# Patient Record
Sex: FEMALE | Race: WHITE | NOT HISPANIC OR LATINO | Employment: OTHER | URBAN - METROPOLITAN AREA
[De-identification: names, ages, dates, MRNs, and addresses within clinical notes are randomized per-mention and may not be internally consistent; named-entity substitution may affect disease eponyms.]

---

## 2017-01-12 ENCOUNTER — AMBULATORY - HEALTHEAST (OUTPATIENT)
Dept: LAB | Facility: CLINIC | Age: 57
End: 2017-01-12

## 2017-01-12 ENCOUNTER — HOSPITAL ENCOUNTER (OUTPATIENT)
Dept: ULTRASOUND IMAGING | Facility: HOSPITAL | Age: 57
Discharge: HOME | End: 2017-01-12
Attending: INTERNAL MEDICINE

## 2017-01-12 DIAGNOSIS — C73 MALIGNANT NEOPLASM OF THYROID GLAND (H): ICD-10-CM

## 2017-01-12 DIAGNOSIS — E89.0 POSTSURGICAL HYPOTHYROIDISM: ICD-10-CM

## 2017-01-19 ENCOUNTER — OFFICE VISIT - HEALTHEAST (OUTPATIENT)
Dept: ENDOCRINOLOGY | Facility: CLINIC | Age: 57
End: 2017-01-19

## 2017-01-19 DIAGNOSIS — E89.0 POSTSURGICAL HYPOTHYROIDISM: ICD-10-CM

## 2017-01-19 DIAGNOSIS — C73 MALIGNANT NEOPLASM OF THYROID GLAND (H): ICD-10-CM

## 2017-01-19 ASSESSMENT — MIFFLIN-ST. JEOR: SCORE: 1384.61

## 2017-04-10 ENCOUNTER — OFFICE VISIT (OUTPATIENT)
Dept: FAMILY MEDICINE | Facility: CLINIC | Age: 57
End: 2017-04-10

## 2017-04-10 VITALS
OXYGEN SATURATION: 98 % | HEART RATE: 66 BPM | RESPIRATION RATE: 18 BRPM | TEMPERATURE: 98 F | DIASTOLIC BLOOD PRESSURE: 83 MMHG | WEIGHT: 177 LBS | BODY MASS INDEX: 29.49 KG/M2 | SYSTOLIC BLOOD PRESSURE: 133 MMHG | HEIGHT: 65 IN

## 2017-04-10 DIAGNOSIS — E89.0 POSTOPERATIVE HYPOTHYROIDISM: Primary | ICD-10-CM

## 2017-04-10 DIAGNOSIS — E78.2 MIXED HYPERLIPIDEMIA: ICD-10-CM

## 2017-04-10 PROBLEM — C43.9 MELANOMA OF SKIN (H): Status: ACTIVE | Noted: 2017-04-10

## 2017-04-10 PROBLEM — G35 MULTIPLE SCLEROSIS (H): Status: ACTIVE | Noted: 2017-04-10

## 2017-04-10 PROBLEM — Z85.850 HX OF THYROID CANCER: Status: ACTIVE | Noted: 2017-04-10

## 2017-04-10 LAB
ALBUMIN SERPL-MCNC: 3.6 G/DL (ref 3.2–4.5)
ALP SERPL-CCNC: 74 U/L (ref 40–150)
ALT SERPL-CCNC: 26 U/L (ref 0–45)
AST SERPL-CCNC: 29 U/L (ref 0–45)
BILIRUB SERPL-MCNC: 0.8 MG/DL (ref 0.2–1.3)
BUN SERPL-MCNC: 11 MG/DL (ref 7–30)
CALCIUM SERPL-MCNC: 9.2 MG/DL (ref 8.5–10.4)
CHLORIDE SERPLBLD-SCNC: 101 MMOL/L (ref 94–109)
CHOLEST SERPL-MCNC: 191 MG/DL
CO2 SERPL-SCNC: 27 MMOL/L (ref 20–32)
CREAT SERPL-MCNC: 0.9 MG/DL (ref 0.6–1.3)
EGFR CALCULATED (BLACK REFERENCE): 82.7 ML/MIN
EGFR CALCULATED (NON BLACK REFERENCE): 68.4 ML/MIN
FASTING?: NORMAL
GLUCOSE SERPL-MCNC: 94 MG/DL (ref 60–109)
HCT VFR BLD AUTO: 43.9 % (ref 35–47)
HDLC SERPL-MCNC: 54 MG/DL
HEMOGLOBIN: 14.4 G/DL (ref 11.7–15.7)
LDLC SERPL CALC-MCNC: 117 MG/DL
MCH RBC QN AUTO: 31.2 PG (ref 26.5–35)
MCHC RBC AUTO-ENTMCNC: 32.8 G/DL (ref 32–36)
MCV RBC AUTO: 95.1 FL (ref 78–100)
PLATELET # BLD AUTO: 330 K/UL (ref 150–450)
POTASSIUM SERPL-SCNC: 4.3 MMOL/L (ref 3.4–5.3)
PROT SERPL-MCNC: 7 G/DL (ref 6.6–8.8)
RBC # BLD AUTO: 4.62 M/UL (ref 3.8–5.2)
SODIUM SERPL-SCNC: 144 MMOL/L (ref 133–144)
TRIGL SERPL-MCNC: 101 MG/DL
TSH SERPL DL<=0.05 MIU/L-ACNC: 0.13 UIU/ML (ref 0.3–5)
WBC # BLD AUTO: 6.7 K/UL (ref 4–11)

## 2017-04-10 RX ORDER — LEVOTHYROXINE SODIUM 100 UG/1
100 TABLET ORAL DAILY
Qty: 90 TABLET | Refills: 3 | COMMUNITY
Start: 2017-04-10 | End: 2018-03-19

## 2017-04-10 RX ORDER — ATORVASTATIN CALCIUM 10 MG/1
10 TABLET, FILM COATED ORAL DAILY
Qty: 90 TABLET | Refills: 3 | COMMUNITY
Start: 2017-04-10 | End: 2018-03-19

## 2017-04-10 NOTE — MR AVS SNAPSHOT
After Visit Summary   4/10/2017    Padma Kelly    MRN: [id number]           Patient Information     Date Of Birth          1960        Visit Information        Provider Department      4/10/2017 8:00 AM Dahiana Rios DO [facility]        Today's Diagnoses     Postablative hypothyroidism    -  1    Mixed hyperlipidemia          Care Instructions    Your medication list is printed, please keep this with you, it is helpful to bring this current list to any other medical appointments, the emergency room or hospital.    If you had lab testing today and your results are reassuring or normal they will be be mailed to you within 7 days.     If the lab tests need quick action we will call you with the results.   The phone number we will call with results is # [phone] (home) . If this is not the best number please call our clinic and change the number.    If you need any refills please call your pharmacy and they will contact us.    If you have any further concerns or wish to schedule another appointment you must call our office during normal business hours  [phone] (8-5:00 M-F)  If you have urgent medical questions that cannot wait  you may also call [phone] at any time of day.  If you have a medical emergency please call [phone].    Thank you for coming to [facility].          Follow-ups after your visit        Who to contact     Please call your clinic at [phone] to:    Ask questions about your health    Make or cancel appointments    Discuss your medicines    Learn about your test results    Speak to your doctor   If you have compliments or concerns about an experience at your clinic, or if you wish to file a complaint, please contact [facility] Patient Relations at [phone] or email us at [email or web address]         Additional Information About Your Visit        MyChart Information     [employer] is an electronic gateway  "that provides easy, online access to your medical records. With [employer], you can request a clinic appointment, read your test results, renew a prescription or communicate with your care team.     To sign up for [employer] visit the website at [email or web address]   You will be asked to enter the access code listed below, as well as some personal information. Please follow the directions to create your username and password.     Your access code is: [email or web address]  Expires: 2017  8:06 AM     Your access code will  in 90 days. If you need help or a new code, please contact your [facility] or call [phone] for assistance.        Care EveryWhere ID     This is your Care EveryWhere ID. This could be used by other organizations to access your [facility] medical records  [id number]        Your Vitals Were     Pulse Temperature Respirations Height Pulse Oximetry BMI (Body Mass Index)    66 98  F (36.7  C) (Oral) 18 5' 4.75\" (164.5 cm) 98% 29.68 kg/m2       Blood Pressure from Last 3 Encounters:   04/10/17 133/83    Weight from Last 3 Encounters:   04/10/17 177 lb (80.3 kg)              We Performed the Following     CBC with Plt ([facility])     Comprehensive Metabolic Panel ([facility]) - results <1hr Protocol     Lipid Panel ([facility]) - Results < 1 hr     [facility]  [facility]        Primary Care Provider Office Phone # Fax #    Dahiana DO Gabriel [phone]       [facility] [address] [place] [address]        Thank you!     Thank you for choosing [facility]  for your care. Our goal is always to provide you with excellent care. Hearing back from our patients is one way we can continue to improve our services. Please take a few minutes to complete the written survey that you may receive in the mail after your visit with us. Thank you!             Your Updated Medication List - Protect others around you: Learn how to safely use, store and " throw away your medicines at www.disposemymeds.org.          This list is accurate as of: 4/10/17  8:46 AM.  Always use your most recent med list.                   Brand Name Dispense Instructions for use    atorvastatin 10 MG tablet    LIPITOR    90 tablet    Take 1 tablet (10 mg) by mouth daily       levothyroxine 100 MCG tablet    SYNTHROID/LEVOTHROID    90 tablet    Take 1 tablet (100 mcg) by mouth daily

## 2017-04-10 NOTE — LETTER
April 12, 2017      Padma Kelly  [address] [address] [address]        Dear Padma,    Labs look good.    Please see below for your test results.    Resulted Orders   Comprehensive Metabolic Panel ([facility]) - results <1hr Protocol   Result Value Ref Range    Glucose 94.0 60.0 - 109.0 mg/dL    Urea Nitrogen 11.0 7.0 - 30.0 mg/dL    Creatinine 0.9 0.6 - 1.3 mg/dL    Sodium 144.0 133.0 - 144.0 mmol/L    Potassium 4.3 3.4 - 5.3 mmol/L    Chloride 101.0 94.0 - 109.0 mmol/L    Carbon Dioxide 27.0 20.0 - 32.0 mmol/L    Calcium 9.2 8.5 - 10.4 mg/dL    Protein Total 7.0 6.6 - 8.8 g/dL    Albumin 3.6 3.2 - 4.5 g/dL    Alkaline Phosphatase 74.0 40.0 - 150.0 U/L    ALT 26.0 0.0 - 45.0 U/L    AST 29.0 0.0 - 45.0 U/L    Bilirubin Total 0.8 0.2 - 1.3 mg/dL    eGFR Calculated (Non Black Reference) 68.4 >60.0 mL/min    eGFR Calculated (Black Reference) 82.7 >60.0 mL/min   TSH  Sensitive ([facility])   Result Value Ref Range    TSH 0.13 (L) 0.30 - 5.00 uIU/mL    Narrative    Test performed by:  [facility]  [address] [place] [address]   CBC with Plt ([facility])   Result Value Ref Range    WBC 6.7 4.0 - 11.0 K/uL    RBC 4.62 3.80 - 5.20 M/uL    Hemoglobin 14.4 11.7 - 15.7 g/dL    Hematocrit 43.9 35.0 - 47.0 %    MCV 95.1 78.0 - 100.0 fL    MCH 31.2 26.5 - 35.0 pg    MCHC 32.8 32.0 - 36.0 g/dL    Platelets 330.0 150.0 - 450.0 K/uL   [facility] Profile [facility]   Result Value Ref Range    Triglycerides 101 <=149 mg/dL    Cholesterol 191 <=199 mg/dL    LDL Cholesterol Calculated 117 <=129 mg/dL    HDL Cholesterol 54 >=50 mg/dL    Fasting? Unknown     Narrative    Test performed by:  [facility]  [address] [place] [address]       If you have any questions, please call the clinic to make an appointment.    Sincerely,    Dahiana Rios, DO

## 2017-04-10 NOTE — NURSING NOTE
4/6/2017 PCS Previsit Plan   DUE FOR:  Mychart? ok  Hep c screening  (Pending records from previous clinic)  Pap  Mammogram  Lipid  Colonoscopy/FIT  STEPHEN Rasheed signed for Entira    [facility] [place], [place] not on our directory, tasked Lavern to have it add to list.   [facility]  [email or web address]   [address] , [place] [address] [phone]    Atorvastatin 10mg 1/28/17 last filled at pharmacy

## 2017-04-10 NOTE — PROGRESS NOTES
HPI:       Padma Kelly is a 56 year old  female with a significant past medical history of multiple sclerosis and thyroid cancer and melanoma and  who presents for follow up of concern(s) listed below    1. Patient was diagnosed with thyroid cancer in 2010, papillary, she had a partial thyroidectomy, and now requires supplementation. She has been doing well. She did see her endocrinologist in January her tumor markers were normal and her thyroid US was normal. Her weight has been stable. She has been feeling very good. She enjoys exercising (biking and golf).    2. Hyperlipidemia: She has been stable on the statin therapy. She is a vegetarian and tries to limit her cholesterol intake.    3. Multiple Sclerosis: She was diagnosed in 1986. She is not on any medications. She saw Dr Esquivel about 2 years ago. She does not have feeling from her waist down in both legs and feet. Her balance is remarkably good. She has not had any recent falls. She continues to walk, bike and golf.    4. Melanoma was diagnosed in 2014. She had the lesion removed from her leg via wide excision and her lymph nodes were clear.         PMHX:     Patient Active Problem List   Diagnosis     Multiple sclerosis (H)     Melanoma of skin (H)     Hx of thyroid cancer     Postablative hypothyroidism     Mixed hyperlipidemia       Current Outpatient Prescriptions   Medication Sig Dispense Refill     atorvastatin (LIPITOR) 10 MG tablet Take 1 tablet (10 mg) by mouth daily 90 tablet 3     levothyroxine (SYNTHROID/LEVOTHROID) 100 MCG tablet Take 1 tablet (100 mcg) by mouth daily 90 tablet 3       Social History     Social History     Marital status:      Spouse name: N/A     Number of children: N/A     Years of education: N/A     Occupational History     Not on file.     Social History Main Topics     Smoking status: Never Smoker     Smokeless tobacco: Not on file     Alcohol use No     Drug use: No     Sexual activity: Not on file  "    Other Topics Concern     Not on file     Social History Narrative     No narrative on file        No Known Allergies    Results for orders placed or performed in visit on 04/10/17 (from the past 24 hour(s))   CBC with Plt ([facility])   Result Value Ref Range    WBC 6.7 4.0 - 11.0 K/uL    RBC 4.62 3.80 - 5.20 M/uL    Hemoglobin 14.4 11.7 - 15.7 g/dL    Hematocrit 43.9 35.0 - 47.0 %    MCV 95.1 78.0 - 100.0 fL    MCH 31.2 26.5 - 35.0 pg    MCHC 32.8 32.0 - 36.0 g/dL    Platelets 330.0 150.0 - 450.0 K/uL   Comprehensive Metabolic Panel ([facility]) - results <1hr Protocol   Result Value Ref Range    Glucose 94.0 60.0 - 109.0 mg/dL    Urea Nitrogen 11.0 7.0 - 30.0 mg/dL    Creatinine 0.9 0.6 - 1.3 mg/dL    Sodium 144.0 133.0 - 144.0 mmol/L    Potassium 4.3 3.4 - 5.3 mmol/L    Chloride 101.0 94.0 - 109.0 mmol/L    Carbon Dioxide 27.0 20.0 - 32.0 mmol/L    Calcium 9.2 8.5 - 10.4 mg/dL    Protein Total 7.0 6.6 - 8.8 g/dL    Albumin 3.6 3.2 - 4.5 g/dL    Alkaline Phosphatase 74.0 40.0 - 150.0 U/L    ALT 26.0 0.0 - 45.0 U/L    AST 29.0 0.0 - 45.0 U/L    Bilirubin Total 0.8 0.2 - 1.3 mg/dL    eGFR Calculated (Non Black Reference) 68.4 >60.0 mL/min    eGFR Calculated (Black Reference) 82.7 >60.0 mL/min            Review of Systems:   C: NEGATIVE for fatigue, unexpected change in weight  E: NEGATIVE for acute vision problems or changes  R: NEGATIVE for significant cough or shortness of breath  CV: NEGATIVE for chest pain, palpitations or new or worsening peripheral edema  P: NEGATIVE for changes in mood or affect          Physical Exam:     Vitals:    04/10/17 0800 04/10/17 0806   BP: 152/84 133/83   Pulse: 66    Resp: 18    Temp: 98  F (36.7  C)    TempSrc: Oral    SpO2: 98%    Weight: 177 lb (80.3 kg)    Height: 5' 4.75\" (164.5 cm)      Body mass index is 29.68 kg/(m^2).    GENERAL APPEARANCE: healthy, alert and no distress,  HENT: ear canals and TM's normal and nose and mouth without ulcers or lesions  RESP: lungs clear to " auscultation - no rales, rhonchi or wheezes  CV: regular rate and rhythm,  and no murmur, click,  rub or gallop  MS: extremities normal- no gross deformities noted      Assessment and Plan     1. Postoperative hypothyroidism    - TSH  Sensitive ([facility])  - levothyroxine (SYNTHROID/LEVOTHROID) 100 MCG tablet; Take 1 tablet (100 mcg) by mouth daily  Dispense: 90 tablet; Refill: 3    2. Mixed hyperlipidemia    - atorvastatin (LIPITOR) 10 MG tablet; Take 1 tablet (10 mg) by mouth daily  Dispense: 90 tablet; Refill: 3  - Comprehensive Metabolic Panel ([facility]) - results <1hr Protocol  - CBC with Plt ([facility])  - Lipid Profile ([facility])      Options for treatment and follow-up care were reviewed with the patient and/or guardian. Padma Kelly and/or guardian engaged in the decision making process and verbalized understanding of the options discussed and agreed with the final plan.    Dahiana Rios, DO

## 2017-04-10 NOTE — PATIENT INSTRUCTIONS
Your medication list is printed, please keep this with you, it is helpful to bring this current list to any other medical appointments, the emergency room or hospital.    If you had lab testing today and your results are reassuring or normal they will be be mailed to you within 7 days.     If the lab tests need quick action we will call you with the results.   The phone number we will call with results is # [phone] (home) . If this is not the best number please call our clinic and change the number.    If you need any refills please call your pharmacy and they will contact us.    If you have any further concerns or wish to schedule another appointment you must call our office during normal business hours  [phone] (8-5:00 M-F)  If you have urgent medical questions that cannot wait  you may also call [phone] at any time of day.  If you have a medical emergency please call [phone].    Thank you for coming to [facility].

## 2017-04-28 PROBLEM — D12.6 BENIGN NEOPLASM OF COLON: Status: ACTIVE | Noted: 2017-04-28

## 2017-10-03 ENCOUNTER — TRANSFERRED RECORDS (OUTPATIENT)
Dept: HEALTH INFORMATION MANAGEMENT | Facility: CLINIC | Age: 57
End: 2017-10-03

## 2017-10-31 DIAGNOSIS — C43.9 MELANOMA OF SKIN (H): Primary | ICD-10-CM

## 2017-10-31 NOTE — PROGRESS NOTES
Patient needs a [facility] her history of melanoma and dysplastic nevi.

## 2018-01-21 ENCOUNTER — HEALTH MAINTENANCE LETTER (OUTPATIENT)
Age: 58
End: 2018-01-21

## 2018-03-16 ENCOUNTER — OFFICE VISIT (OUTPATIENT)
Dept: FAMILY MEDICINE | Facility: CLINIC | Age: 58
End: 2018-03-16
Payer: COMMERCIAL

## 2018-03-16 VITALS
DIASTOLIC BLOOD PRESSURE: 87 MMHG | TEMPERATURE: 97.9 F | SYSTOLIC BLOOD PRESSURE: 137 MMHG | BODY MASS INDEX: 31.02 KG/M2 | WEIGHT: 185 LBS | HEART RATE: 59 BPM | OXYGEN SATURATION: 100 %

## 2018-03-16 DIAGNOSIS — Z11.59 NEED FOR HEPATITIS C SCREENING TEST: ICD-10-CM

## 2018-03-16 DIAGNOSIS — Z71.3 DIETARY COUNSELING AND SURVEILLANCE: ICD-10-CM

## 2018-03-16 DIAGNOSIS — E89.0 POSTOPERATIVE HYPOTHYROIDISM: Primary | ICD-10-CM

## 2018-03-16 DIAGNOSIS — E78.2 MIXED HYPERLIPIDEMIA: ICD-10-CM

## 2018-03-16 LAB
ALBUMIN SERPL-MCNC: 4 G/DL (ref 3.2–4.5)
ALP SERPL-CCNC: 100 U/L (ref 40–150)
ALT SERPL-CCNC: 55 U/L (ref 0–45)
AST SERPL-CCNC: 47 U/L (ref 0–45)
BILIRUB SERPL-MCNC: 1 MG/DL (ref 0.2–1.3)
BUN SERPL-MCNC: 10 MG/DL (ref 7–30)
CALCIUM SERPL-MCNC: 9.3 MG/DL (ref 8.5–10.4)
CHLORIDE SERPLBLD-SCNC: 100 MMOL/L (ref 94–109)
CHOLEST SERPL-MCNC: 177 MG/DL
CHOLEST/HDLC SERPL: 2.5 RATIO
CO2 SERPL-SCNC: 29 MMOL/L (ref 20–32)
CREAT SERPL-MCNC: 0.7 MG/DL (ref 0.6–1.3)
EGFR CALCULATED (BLACK REFERENCE): >90 ML/MIN
EGFR CALCULATED (NON BLACK REFERENCE): >90 ML/MIN
GLUCOSE SERPL-MCNC: 89 MG/DL (ref 60–109)
HDLC SERPL-MCNC: 71 MG/DL
LDLC SERPL CALC-MCNC: 74 MG/DL (ref 0–99)
POTASSIUM SERPL-SCNC: 4.2 MMOL/L (ref 3.4–5.3)
PROT SERPL-MCNC: 7.2 G/DL (ref 6.6–8.8)
SODIUM SERPL-SCNC: 138 MMOL/L (ref 133–144)
TRIGL SERPL-MCNC: 158 MG/DL
TSH SERPL DL<=0.05 MIU/L-ACNC: 0.17 UIU/ML (ref 0.3–5)
VLDL-CHOLESTEROL: 32 MG/DL (ref 7–32)

## 2018-03-16 NOTE — NURSING NOTE
Patient has mammogram set up for 4/13/2018  Provider to discuss pap with patient.   ~ Tereso HONG ([place]) CMA

## 2018-03-16 NOTE — PATIENT INSTRUCTIONS
Keep your mammogram appt on 4-13-18.    I will call you with lab results.  Aerobic Exercise for a Healthy Heart  Exercise is a lot more than an energy booster and a stress reliever. It also strengthens your heart muscle, lowers your blood pressure and cholesterol, and burns calories. It can also improve your resting muscle tone, and your mood.     Remember, some activity is better than none.    Choose an aerobic activity  Choose an activity that makes your heart and lungs work harder than they do when you rest or walk normally. This aerobic exercise can improve the way your heart and other muscles use oxygen. Make it fun by exercising with a friend and choosing an activity you enjoy. Here are some ideas:    Walking    Swimming    Bicycling    Stair climbing    Dancing    Jogging    Gardening  Exercise regularly  If you haven t been exercising regularly,  get your doctor s OK first. Then start slowly.  Here are some tips:    Begin exercising 3 times a week for 5 to 10 minutes at a time.    When you feel comfortable, add a few minutes each session.    Slowly build up to exercising 3 to 4 times each week. Each session should last for 40 minutes, on average, and involve moderate- to vigorous-intensity physical activity.    If you have been given nitroglycerin, be sure to carry it when you exercise.    If you get chest pain (angina) when you re exercising, stop what you re doing, take your nitroglycerin, and call your doctor.  Date Last Reviewed: 6/2/2016 2000-2017 [employer]. [address] [place] [address], [place] [address]. All rights reserved. This information is not intended as a substitute for professional medical care. Always follow your healthcare professional's instructions.

## 2018-03-16 NOTE — PROGRESS NOTES
HPI:       Padma Kelly is a 57 year old  female with a significant past medical history of multiple sclerosis who presents for follow up of concern(s) listed below      1. Hypothyroid: Due for labwork today, diagnosed with follicular variant papillary carcinoma of thyroid  in 2014, she has not seen her endocrinologist recently and is due for thyroid US    2. Hyperlipidemia: Taking atorvastatin- without problems  The 10-year ASCVD risk score (Stanfieldjessica SEGURA Jr, et al., 2013) is: 3.3%    Values used to calculate the score:      Age: 57 years      Sex: Female      Is Non- : No      Diabetic: No      Tobacco smoker: No      Systolic Blood Pressure: 137 mmHg      Is BP treated: No      HDL Cholesterol: 54 mg/dL      Total Cholesterol: 191 mg/dL  Her blood pressure is slightly up today, but so is her weight and she has stated she has not bee exercising regularly    3. History of melanoma: saw her dermatologist in 10/2017 and was doing well    4. HCM: Mammogram is scheduled for 4-13-18 Declines pap               PMHX:     Patient Active Problem List   Diagnosis     Multiple sclerosis (H)     Melanoma of skin (H)     Hx of thyroid cancer     Postablative hypothyroidism     Mixed hyperlipidemia     Benign neoplasm of colon       Current Outpatient Prescriptions   Medication Sig Dispense Refill     atorvastatin (LIPITOR) 10 MG tablet Take 1 tablet (10 mg) by mouth daily 90 tablet 3     levothyroxine (SYNTHROID/LEVOTHROID) 100 MCG tablet Take 1 tablet (100 mcg) by mouth daily 90 tablet 3       Social History     Social History     Marital status:      Spouse name: N/A     Number of children: N/A     Years of education: N/A     Occupational History     Retired      Social History Main Topics     Smoking status: Never Smoker     Smokeless tobacco: Never Used     Alcohol use No     Drug use: No     Sexual activity: No     Other Topics Concern     Not on file     Social History Narrative     Education: 2 yr College        No Known Allergies    Results for orders placed or performed in visit on 03/16/18 (from the past 24 hour(s))   Lipid Panel ([facility]) - Results < 1 hr   Result Value Ref Range    Cholesterol 177.0 <200.0 mg/dL    Triglycerides 158.0 (H) <150.0 mg/dL    HDL Cholesterol 71.0 >50.0 mg/dL    VLDL-Cholesterol 32.0 7.0 - 32.0 mg/dL    LDL Cholesterol Direct 74.0 0.0 - 99.0 mg/dL    Cholesterol/HDL Ratio 2.5 <5.0 RATIO   Comprehensive Metabolic Panel ([facility]) - results <1hr Protocol   Result Value Ref Range    Glucose 89.0 60.0 - 109.0 mg/dL    Urea Nitrogen 10.0 7.0 - 30.0 mg/dL    Creatinine 0.7 0.6 - 1.3 mg/dL    Sodium 138.0 133.0 - 144.0 mmol/L    Potassium 4.2 3.4 - 5.3 mmol/L    Chloride 100.0 94.0 - 109.0 mmol/L    Carbon Dioxide 29.0 20.0 - 32.0 mmol/L    Calcium 9.3 8.5 - 10.4 mg/dL    Protein Total 7.2 6.6 - 8.8 g/dL    Albumin 4.0 3.2 - 4.5 g/dL    Alkaline Phosphatase 100.0 40.0 - 150.0 U/L    ALT 55.0 (H) 0.0 - 45.0 U/L    AST 47.0 (H) 0.0 - 45.0 U/L    Bilirubin Total 1.0 0.2 - 1.3 mg/dL    eGFR Calculated (Non Black Reference) >90 >60.0 mL/min    eGFR Calculated (Black Reference) >90 >60.0 mL/min            Review of Systems:   C: NEGATIVE for fatigue, unexpected change in weight  R: NEGATIVE for significant cough or shortness of breath  CV: NEGATIVE for chest pain, palpitations or new or worsening peripheral edema  P: NEGATIVE for changes in mood or affect  MSK: burning pain in feet at times          Physical Exam:     Vitals:    03/16/18 0840 03/16/18 0843   BP: 143/82 137/87   Pulse: 59    Temp: 97.9  F (36.6  C)    TempSrc: Oral    SpO2: 100%    Weight: 185 lb (83.9 kg)      Body mass index is 31.02 kg/(m^2).    GENERAL APPEARANCE: healthy, alert and no distress,  HENT: ear canals and TM's normal and nose and mouth without ulcers or lesions  NECK: no adenopathy, no asymmetry, masses, or scars and thyroid normal to palpation  RESP: lungs clear to auscultation - no rales,  rhonchi or wheezes  CV: regular rate and rhythm,  and no murmur, click,  rub or gallop  MS: extremities normal- no gross deformities noted      Assessment and Plan     1. Postoperative hypothyroidism  -Continue levothyroxine  - TSH  Sensitive ([facility])    2. Mixed hyperlipidemia  -Continue atorvastatin, will adjust medication if needed based on lab results  - Lipid Panel ([facility]) - Results < 1 hr  - Comprehensive Metabolic Panel ([facility]) - results <1hr Protocol    3. Need for hepatitis C screening test  Patient is not high risk but is in recommended age range for Hep C screening  - Hepatitis C Antibody ([facility])    Discussion on Advanced Directives and who will help her make decisions, since she does not have any children to help make decisions    Options for treatment and follow-up care were reviewed with the patient and/or guardian. Padma Kelly and/or guardian engaged in the decision making process and verbalized understanding of the options discussed and agreed with the final plan.    Dahiana Rios, DO

## 2018-03-16 NOTE — MR AVS SNAPSHOT
After Visit Summary   3/16/2018    Padma Kelly    MRN: [id number]           Patient Information     Date Of Birth          1960        Visit Information        Provider Department      3/16/2018 8:40 AM Dahiana Rios DO [facility]        Today's Diagnoses     Postoperative hypothyroidism    -  1    Mixed hyperlipidemia        Need for hepatitis C screening test        Dietary counseling and surveillance          Care Instructions    Keep your mammogram appt on 4-13-18.    I will call you with lab results.  Aerobic Exercise for a Healthy Heart  Exercise is a lot more than an energy booster and a stress reliever. It also strengthens your heart muscle, lowers your blood pressure and cholesterol, and burns calories. It can also improve your resting muscle tone, and your mood.     Remember, some activity is better than none.    Choose an aerobic activity  Choose an activity that makes your heart and lungs work harder than they do when you rest or walk normally. This aerobic exercise can improve the way your heart and other muscles use oxygen. Make it fun by exercising with a friend and choosing an activity you enjoy. Here are some ideas:    Walking    Swimming    Bicycling    Stair climbing    Dancing    Jogging    Gardening  Exercise regularly  If you haven t been exercising regularly,  get your doctor s OK first. Then start slowly.  Here are some tips:    Begin exercising 3 times a week for 5 to 10 minutes at a time.    When you feel comfortable, add a few minutes each session.    Slowly build up to exercising 3 to 4 times each week. Each session should last for 40 minutes, on average, and involve moderate- to vigorous-intensity physical activity.    If you have been given nitroglycerin, be sure to carry it when you exercise.    If you get chest pain (angina) when you re exercising, stop what you re doing, take your nitroglycerin, and call your doctor.  Date Last Reviewed: 6/2/2016     3069-3405 [employer]. [address] [place] [address]. All rights reserved. This information is not intended as a substitute for professional medical care. Always follow your healthcare professional's instructions.                Follow-ups after your visit        Follow-up notes from your care team     Return if symptoms worsen or fail to improve.      Who to contact     Please call your clinic at [phone] to:    Ask questions about your health    Make or cancel appointments    Discuss your medicines    Learn about your test results    Speak to your doctor            Additional Information About Your Visit        [email or web address] Information     [employer] is an electronic gateway that provides easy, online access to your medical records. With [employer], you can request a clinic appointment, read your test results, renew a prescription or communicate with your care team.     To sign up for [employer] visit the website at [email or web address]   You will be asked to enter the access code listed below, as well as some personal information. Please follow the directions to create your username and password.     Your access code is: [email or web address]  Expires: 2018  8:38 AM     Your access code will  in 90 days. If you need help or a new code, please contact your [facility] or call [phone] for assistance.        Care EveryWhere ID     This is your Care EveryWhere ID. This could be used by other organizations to access your [facility] medical records  [id number]        Your Vitals Were     Pulse Temperature Pulse Oximetry BMI (Body Mass Index)          59 97.9  F (36.6  C) (Oral) 100% 31.02 kg/m2         Blood Pressure from Last 3 Encounters:   18 137/87   04/10/17 133/83    Weight from Last 3 Encounters:   18 185 lb (83.9 kg)   04/10/17 177 lb (80.3 kg)              We Performed the Following     Comprehensive Metabolic Panel ([facility]) - results <1hr  Protocol     Hepatitis C Antibody ([facility])     Lipid Panel ([facility]) - Results < 1 hr     TSH  Sensitive ([facility])          Today's Medication Changes          These changes are accurate as of 3/16/18 12:26 PM.  If you have any questions, ask your nurse or doctor.               Start taking these medicines.        Dose/Directions    calcium carbonate-vitamin D 600-400 MG-UNIT Chew   Used for:  Dietary counseling and surveillance   Started by:  Dahiana Rios DO        Dose:  1 chew tab   Take 1 chew tab by mouth 2 times daily   Quantity:  180 tablet   Refills:  3            Where to get your medicines      Some of these will need a paper prescription and others can be bought over the counter.  Ask your nurse if you have questions.     You don't need a prescription for these medications     calcium carbonate-vitamin D 600-400 MG-UNIT Chew                Primary Care Provider Office Phone # Fax #    Dahiana Rios [phone]       [facility] [address] [place] [address]        Equal Access to Services     CHARANJIT BARKSDALE AH: Hadii monica ku hadasho Soomaali, waaxda luqadaha, qaybta kaalmada adeegyada, waxay ayeshain hayjetn chelsey austin . So [facility] [phone].    ATENCIÓN: Si habla español, tiene a elam disposición servicios gratuitos de asistencia lingüística. Llame al [phone].    We comply with applicable federal civil rights laws and [place] laws. We do not discriminate on the basis of race, color, national origin, age, disability, sex, sexual orientation, or gender identity.            Thank you!     Thank you for choosing [facility]  for your care. Our goal is always to provide you with excellent care. Hearing back from our patients is one way we can continue to improve our services. Please take a few minutes to complete the written survey that you may receive in the mail after your visit with us. Thank you!             Your Updated Medication List - Protect  others around you: Learn how to safely use, store and throw away your medicines at www.disposemymeds.org.          This list is accurate as of 3/16/18 12:26 PM.  Always use your most recent med list.                   Brand Name Dispense Instructions for use Diagnosis    atorvastatin 10 MG tablet    LIPITOR    90 tablet    Take 1 tablet (10 mg) by mouth daily    Mixed hyperlipidemia       calcium carbonate-vitamin D 600-400 MG-UNIT Chew     180 tablet    Take 1 chew tab by mouth 2 times daily    Dietary counseling and surveillance       levothyroxine 100 MCG tablet    SYNTHROID/LEVOTHROID    90 tablet    Take 1 tablet (100 mcg) by mouth daily    Postoperative hypothyroidism

## 2018-03-19 ENCOUNTER — RECORDS - HEALTHEAST (OUTPATIENT)
Dept: ADMINISTRATIVE | Facility: OTHER | Age: 58
End: 2018-03-19

## 2018-03-19 ENCOUNTER — TELEPHONE (OUTPATIENT)
Dept: FAMILY MEDICINE | Facility: CLINIC | Age: 58
End: 2018-03-19

## 2018-03-19 DIAGNOSIS — Z85.850 PERSONAL HISTORY OF MALIGNANT NEOPLASM OF THYROID: Primary | ICD-10-CM

## 2018-03-19 DIAGNOSIS — E89.0 POSTOPERATIVE HYPOTHYROIDISM: ICD-10-CM

## 2018-03-19 DIAGNOSIS — E78.2 MIXED HYPERLIPIDEMIA: ICD-10-CM

## 2018-03-19 RX ORDER — LEVOTHYROXINE SODIUM 100 UG/1
100 TABLET ORAL DAILY
Qty: 90 TABLET | Refills: 3 | Status: SHIPPED | OUTPATIENT
Start: 2018-03-19 | End: 2019-04-09

## 2018-03-19 RX ORDER — ATORVASTATIN CALCIUM 10 MG/1
10 TABLET, FILM COATED ORAL DAILY
Qty: 90 TABLET | Refills: 3 | Status: SHIPPED | OUTPATIENT
Start: 2018-03-19 | End: 2019-04-09

## 2018-03-19 NOTE — TELEPHONE ENCOUNTER
Your labs are good. The prescriptions have been refilled. I would like you to have a neck US again this year. You will be called to schedule

## 2018-03-21 ENCOUNTER — HOSPITAL ENCOUNTER (OUTPATIENT)
Dept: ULTRASOUND IMAGING | Facility: HOSPITAL | Age: 58
Discharge: HOME | End: 2018-03-21
Attending: FAMILY MEDICINE

## 2018-03-21 DIAGNOSIS — Z85.850 PERSONAL HISTORY OF MALIGNANT NEOPLASM OF THYROID: ICD-10-CM

## 2018-03-21 DIAGNOSIS — E89.0 POST-OPERATIVE HYPOTHYROIDISM: ICD-10-CM

## 2018-03-21 NOTE — PATIENT INSTRUCTIONS
Referral for ( TEST )  :      US Head Neck Soft Tissue  LOCATION/PLACE/Provider :    [facility]  DATE & TIME :     3- at 9:00  PHONE :     [phone]  FAX :     [phone]

## 2018-05-18 ENCOUNTER — TELEPHONE (OUTPATIENT)
Dept: FAMILY MEDICINE | Facility: CLINIC | Age: 58
End: 2018-05-18

## 2018-05-18 NOTE — TELEPHONE ENCOUNTER
Pt referral has to be entered electrically by [facility] per insurance provider help line.  [employer] help desk nnumber is [phone]    Laura MOODY is assisting me with this and has sent a message to Zeferino and Constance to find out how these referral get handeled and submitted to insurance. Awaiting a response.

## 2018-05-18 NOTE — TELEPHONE ENCOUNTER
Called patient insurance company to see why they needed a referral since the patient has been going to the dermatologist for multiple years. Spoke with a representative who stated they don't have any record of referrals since 2005. I explained to the representative that the patient has been seeing the same dermatology company for same thing for a number of years. The representative could not tell me why this wasn't caught earlier or how the dermatology services were being covered.    Called our billing department to see if they knew anything about the referral process and submitting to the insurance company and they were not able to help me.

## 2018-05-18 NOTE — TELEPHONE ENCOUNTER
[facility] phone call message - order or referral request for patient:     Order or referral being requested: Dermatology Referral     Additional Comments: Patient is calling stating that she needs a referral to be sent to her insurance, she says that [facility] have received it, but that her insurance needs one as well.     OK to leave a message on voice mail? Yes    Primary language: English      needed? No    Call taken on May 18, 2018 at 10:14 AM by Michelle Yañez

## 2018-11-05 ENCOUNTER — TRANSFERRED RECORDS (OUTPATIENT)
Dept: HEALTH INFORMATION MANAGEMENT | Facility: CLINIC | Age: 58
End: 2018-11-05

## 2019-04-05 ENCOUNTER — OFFICE VISIT (OUTPATIENT)
Dept: FAMILY MEDICINE | Facility: CLINIC | Age: 59
End: 2019-04-05
Payer: COMMERCIAL

## 2019-04-05 ENCOUNTER — RECORDS - HEALTHEAST (OUTPATIENT)
Dept: ADMINISTRATIVE | Facility: OTHER | Age: 59
End: 2019-04-05

## 2019-04-05 VITALS
DIASTOLIC BLOOD PRESSURE: 91 MMHG | HEART RATE: 64 BPM | SYSTOLIC BLOOD PRESSURE: 152 MMHG | RESPIRATION RATE: 18 BRPM | OXYGEN SATURATION: 98 % | BODY MASS INDEX: 32.78 KG/M2 | HEIGHT: 64 IN | TEMPERATURE: 98.2 F | WEIGHT: 192 LBS

## 2019-04-05 DIAGNOSIS — Z85.850 HX OF THYROID CANCER: ICD-10-CM

## 2019-04-05 DIAGNOSIS — G35 MULTIPLE SCLEROSIS (H): ICD-10-CM

## 2019-04-05 DIAGNOSIS — R03.0 ELEVATED BLOOD PRESSURE READING WITHOUT DIAGNOSIS OF HYPERTENSION: ICD-10-CM

## 2019-04-05 DIAGNOSIS — E89.0 POSTABLATIVE HYPOTHYROIDISM: Primary | ICD-10-CM

## 2019-04-05 DIAGNOSIS — E89.0 POSTOPERATIVE HYPOTHYROIDISM: ICD-10-CM

## 2019-04-05 DIAGNOSIS — E78.2 MIXED HYPERLIPIDEMIA: ICD-10-CM

## 2019-04-05 PROBLEM — C73 MALIGNANT NEOPLASM OF THYROID GLAND (H): Status: ACTIVE | Noted: 2019-04-05

## 2019-04-05 LAB
% GRANULOCYTES: 57.8 %G (ref 40–75)
ALBUMIN SERPL-MCNC: 3.9 G/DL (ref 3.2–4.5)
ALP SERPL-CCNC: 104 U/L (ref 40–150)
ALT SERPL-CCNC: 43 U/L (ref 0–45)
AST SERPL-CCNC: 38 U/L (ref 0–45)
BILIRUB SERPL-MCNC: 1 MG/DL (ref 0.2–1.3)
BUN SERPL-MCNC: 9 MG/DL (ref 7–30)
CALCIUM SERPL-MCNC: 9.6 MG/DL (ref 8.5–10.4)
CHLORIDE SERPLBLD-SCNC: 103 MMOL/L (ref 94–109)
CHOLEST SERPL-MCNC: 191 MG/DL
CHOLEST/HDLC SERPL: 2.7 RATIO
CO2 SERPL-SCNC: 29 MMOL/L (ref 20–32)
CREAT SERPL-MCNC: 0.6 MG/DL (ref 0.6–1.3)
EGFR CALCULATED (BLACK REFERENCE): >90 ML/MIN
EGFR CALCULATED (NON BLACK REFERENCE): >90 ML/MIN
GLUCOSE SERPL-MCNC: 96 MG/DL (ref 60–109)
GRANULOCYTES #: 3.1 K/UL (ref 1.6–8.3)
HCT VFR BLD AUTO: 47 % (ref 35–47)
HDLC SERPL-MCNC: 72 MG/DL
HEMOGLOBIN: 15 G/DL (ref 11.7–15.7)
LDLC SERPL CALC-MCNC: 79 MG/DL (ref 0–99)
LYMPHOCYTES # BLD AUTO: 1.8 K/UL (ref 0.8–5.3)
LYMPHOCYTES NFR BLD AUTO: 33.8 %L (ref 20–48)
MCH RBC QN AUTO: 31.1 PG (ref 26.5–35)
MCHC RBC AUTO-ENTMCNC: 31.9 G/DL (ref 32–36)
MCV RBC AUTO: 97.4 FL (ref 78–100)
MID #: 0.5 K/UL (ref 0–2.2)
MID %: 8.4 %M (ref 0–20)
PLATELET # BLD AUTO: 300 K/UL (ref 150–450)
POTASSIUM SERPL-SCNC: 4.3 MMOL/L (ref 3.4–5.3)
PROT SERPL-MCNC: 7.5 G/DL (ref 6.6–8.8)
RBC # BLD AUTO: 4.83 M/UL (ref 3.8–5.2)
SODIUM SERPL-SCNC: 146 MMOL/L (ref 133–144)
TRIGL SERPL-MCNC: 200 MG/DL
TSH SERPL DL<=0.05 MIU/L-ACNC: 0.38 UIU/ML (ref 0.3–5)
VLDL-CHOLESTEROL: 40 MG/DL (ref 7–32)
WBC # BLD AUTO: 5.4 K/UL (ref 4–11)

## 2019-04-05 ASSESSMENT — MIFFLIN-ST. JEOR: SCORE: 1425.91

## 2019-04-05 NOTE — PROGRESS NOTES
Chief Complaint   Patient presents with     Medication Request     Needs refills on synthoid     Medication Reconciliation     Completed            HPI:       Padma Kelly is a 58 year old  female with a significant past medical history of multiple sclerosis who presents for follow up of concern(s) listed below    1. History of thyroid cancer: Diagnosed in 2014, last saw endocrine in 2017,   SUMMARY from endocrine note in 2017  1. Follicular variant of Thyroid cancer diagnosed 5/6/2014. She had total thyroidectomy done on 5/6/2014 by Dr Osorio. Initially the patient had an incidental thyroid nodule which resulted in a thyroid ultrasound and then an UG-FNA.   At diagnosis she had no symptoms of dysphonia, dysphagia and odynophagia. She is on levothyroxine 112mcg daily. WHARTON therapy was done 8/7 2014-75mCi. Post scan on 8/13 showed residual uptake in the neck of 2.2% compatible with remnant thyroid tissue.   Risk factors for thyroid cancer were absent.   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 Her thyroglobulin by mass spec was 0.7 ng per ml on January 15, 2015.Thyroid cancer was initially intermediate risk based on focal extra thyroid invasion.   March 2015: neck ultrasound in March 2015 shown a decrease in the size of the lesion in the right thyroid bed 2 7 x 3 x 4 mm. Recent 12 month old body scan was done on July 23, 2015. It showed 1.6% uptake but not localized to the neck. There was an indeterminate uptake in the mediastinum but not concerning.     She has had a thyroid US in 2017 and 2018 with no  change. She would be due for another US and then she would be 5 years out from diagnosis and appears to be stable. She has no concerns except for occasional hotflashes and stomach gurgling    2. HCM: Colonoscopy: last was 4/2016.  Survellaince next due in 2021  Mammogram: Scheduled for next week , maternal grandmother 40 year survivor    3. BP: Her blood pressure is elevated here in clinic, she has a arm cuff at home, checks occasionally, but did check yesterday and was 125/78. No chest pains. She is a vegetarian and eats healthy. She exercises regularly mostly yoga and biking.    4. Multiple Sclerosis: She feels her last flare was 15 years ago, she does not follow with neurology and she has never been on any medication. She does have numbness in her feet and lower legs bilaterally, but does not fall, except she had a fall while hiking on ice recently and hurt her right knee.         PMHX:     Patient Active Problem List   Diagnosis     Multiple sclerosis (H)     Melanoma of skin (H)     Hx of thyroid cancer     Postablative hypothyroidism     Mixed hyperlipidemia     Benign neoplasm of colon     Thyroid malignant neoplasm (H)     Melanoma of skin of lower limb (H)     Hypothyroidism       Current Outpatient Medications   Medication Sig Dispense Refill     atorvastatin (LIPITOR) 10 MG tablet Take 1 tablet (10 mg) by mouth daily 90 tablet 3     calcium carbonate-vitamin D 600-400 MG-UNIT CHEW Take 1 chew tab by mouth 2 times daily 180 tablet 3     levothyroxine (SYNTHROID/LEVOTHROID) 100 MCG tablet Take 1 tablet (100 mcg) by mouth daily 90 tablet 3       Social History     Socioeconomic History     Marital status:      Spouse name: Not on file     Number of children: Not on file     Years of education: Not on file     Highest education level: Not on file   Occupational History     Occupation: Retired   Social Needs     Financial resource strain: Not on file     Food insecurity:     Worry: Not on file      Inability: Not on file     Transportation needs:     Medical: Not on file     Non-medical: Not on file   Tobacco Use     Smoking status: Never Smoker     Smokeless tobacco: Never Used   Substance and Sexual Activity     Alcohol use: No     Drug use: No     Sexual activity: No   Lifestyle     Physical activity:     Days per week: Not on file     Minutes per session: Not on file     Stress: Not on file   Relationships     Social connections:     Talks on phone: Not on file     Gets together: Not on file     Attends Faith service: Not on file     Active member of club or organization: Not on file     Attends meetings of clubs or organizations: Not on file     Relationship status: Not on file     Intimate partner violence:     Fear of current or ex partner: Not on file     Emotionally abused: Not on file     Physically abused: Not on file     Forced sexual activity: Not on file   Other Topics Concern     Not on file   Social History Narrative    Education: 2 yr College          Allergies   Allergen Reactions     No Known Allergies        Results for orders placed or performed in visit on 04/05/19 (from the past 24 hour(s))   Lipid Panel ([facility]) - Results < 1 hr   Result Value Ref Range    Cholesterol 191.0 <200.0 mg/dL    Triglycerides 200.0 (H) <150.0 mg/dL    HDL Cholesterol 72.0 >50.0 mg/dL    VLDL-Cholesterol 40.0 (H) 7.0 - 32.0 mg/dL    LDL Cholesterol Direct 79.0 0.0 - 99.0 mg/dL    Cholesterol/HDL Ratio 2.7 <5.0 RATIO   CBC with Diff Plt ([facility])   Result Value Ref Range    WBC 5.4 4.0 - 11.0 K/uL    Lymphocytes # 1.8 0.8 - 5.3 K/uL    % Lymphocytes 33.8 20.0 - 48.0 %L    Mid # 0.5 0.0 - 2.2 K/uL    Mid % 8.4 0.0 - 20.0 %M    GRANULOCYTES # 3.1 1.6 - 8.3 K/uL    % Granulocytes 57.8 40.0 - 75.0 %G    RBC 4.83 3.80 - 5.20 M/uL    Hemoglobin 15.0 11.7 - 15.7 g/dL    Hematocrit 47.0 35.0 - 47.0 %    MCV 97.4 78.0 - 100.0 fL    MCH 31.1 26.5 - 35.0 pg    MCHC 31.9 (L) 32.0 - 36.0 g/dL    Platelets 300.0 150.0 -  "450.0 K/uL     ROS:  C: NEGATIVE for fatigue, unexpected change in weight  EYES: Positive for needing readers  GI: Positive for stomach gurgling  P: NEGATIVE for changes in mood or affect         Physical Exam:     Vitals:    04/05/19 0757 04/05/19 0800   BP: (!) 161/91 (!) 152/91   Pulse: 64    Resp: 18    Temp: 98.2  F (36.8  C)    SpO2: 98%    Weight: 87.1 kg (192 lb)    Height: 1.626 m (5' 4\")      Body mass index is 32.96 kg/m .    GENERAL APPEARANCE: healthy, alert and no distress,  EYES: Eyes grossly normal to inspection,  PERRL  HENT: ear canals and TM's normal and nose and mouth without ulcers or lesions  NECK: no adenopathy, no asymmetry, masses, or scars and thyroid normal to palpation  RESP: lungs clear to auscultation - no rales, rhonchi or wheezes  CV: regular rate and rhythm,  and no murmur, click,  rub or gallop  MS: extremities normal- no gross deformities noted      Assessment and Plan     1. Postablative hypothyroidism  Will adjust thyroid medication as needed  - TSH  Sensitive ([facility])  - US Thyroid    2. Hx of thyroid cancer  She is now 5 years out, she has not seen endocrine since 2017. Will repeat US     3. Multiple sclerosis (H)  Discussed seeing a neurologist, she feels she has been in remission for the past 15 years, she has no new complaints and is not interested in starting medications at this time. She will hold off for now on consultation because she feels her health is stable, if there are any changes she will let us know.    4. Mixed hyperlipidemia  She is on a vegetarian diet and exercises regularly  Continue on statin therapy  - Lipid Panel ([facility]) - Results < 1 hr  - Comprehensive Metabolic Panel ([facility]) - results <1hr Protocol  - CBC with Diff Plt ([facility])    5. Elevated blood pressure reading without diagnosis of hypertension  Discussed monitoring weekly, if persistently elevated at home, will need to add medication        Options for treatment and follow-up care were " reviewed with the patient and/or guardian. Padma Kelly and/or guardian engaged in the decision making process and verbalized understanding of the options discussed and agreed with the final plan.    Dahiana Rios, DO

## 2019-04-05 NOTE — PATIENT INSTRUCTIONS
Please check your blood pressure weekly.    Referral for ( TEST )  :      US Thyroid   LOCATION/PLACE/Provider :    [facility]   DATE & TIME :     4-8-2019 at 8:45am  PHONE :     [phone]  FAX :     [phone]  Appointment made by clinic staff/:    Mojgan

## 2019-04-08 ENCOUNTER — HOSPITAL ENCOUNTER (OUTPATIENT)
Dept: ULTRASOUND IMAGING | Facility: HOSPITAL | Age: 59
Discharge: HOME | End: 2019-04-08
Attending: FAMILY MEDICINE

## 2019-04-08 DIAGNOSIS — Z85.850 HISTORY OF THYROID CANCER: ICD-10-CM

## 2019-04-08 DIAGNOSIS — E89.2 POSTABLATIVE HYPOPARATHYROIDISM (H): ICD-10-CM

## 2019-04-09 RX ORDER — LEVOTHYROXINE SODIUM 100 UG/1
100 TABLET ORAL DAILY
Qty: 90 TABLET | Refills: 3 | Status: SHIPPED | OUTPATIENT
Start: 2019-04-09 | End: 2020-03-05

## 2019-04-09 RX ORDER — ATORVASTATIN CALCIUM 10 MG/1
10 TABLET, FILM COATED ORAL DAILY
Qty: 90 TABLET | Refills: 3 | Status: SHIPPED | OUTPATIENT
Start: 2019-04-09 | End: 2020-04-27

## 2019-04-17 ENCOUNTER — TRANSFERRED RECORDS (OUTPATIENT)
Dept: HEALTH INFORMATION MANAGEMENT | Facility: CLINIC | Age: 59
End: 2019-04-17

## 2019-10-03 NOTE — PROGRESS NOTES
Chief Complaint   Patient presents with     RECHECK     Follow up Thyroid     Knee Pain     Check left knee            HPI:       Padma Kelly is a 59 year old  female with a significant past medical history of multiple sclerosis who presents for follow up of concern(s) listed below    1. History of thyroid cancer: Diagnosed in 2014, last saw endocrine in 2017,   SUMMARY from endocrine note in 2017  1. Follicular variant of Thyroid cancer diagnosed 5/6/2014. She had total thyroidectomy done on 5/6/2014 by Dr Osorio. Initially the patient had an incidental thyroid nodule which resulted in a thyroid ultrasound and then an UG-FNA.   At diagnosis she had no symptoms of dysphonia, dysphagia and odynophagia. She is on levothyroxine 112mcg daily. WHARTON therapy was done 8/7 2014-75mCi. Post scan on 8/13 showed residual uptake in the neck of 2.2% compatible with remnant thyroid tissue.   Risk factors for thyroid cancer were absent.   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 Her thyroglobulin by mass spec was 0.7 ng per ml on January 15, 2015.Thyroid cancer was initially intermediate risk based on focal extra thyroid invasion.   March 2015: neck ultrasound in March 2015 shown a decrease in the size of the lesion in the right thyroid bed 2 7 x 3 x 4 mm. Recent 12 month old body scan was done on July 23, 2015. It showed 1.6% uptake but not localized to the neck. There was an indeterminate uptake in the mediastinum but not concerning.      She has had a thyroid US in 2017 and 2018 and 2019 with no change.      2. Left  Knee pain: 4 months: Walking more with her new dog, hurts at back of knee, night time it hurts with rolling over, stairs and downhill is difficult, maybe swollen, no pain with palpation. She remains active and walks the golf course 1-2 times per week.   Feels like it is going to give out     3. BP: Her blood pressure is elevated here in clinic, she has a arm cuff at home, checks occasionally. She states at home her blood pressure is normal. Blood pressure elevated last visit and today. No chest pains. She is a vegetarian and eats healthy. She exercises regularly mostly yoga and biking.     4. Multiple Sclerosis: She feels her last flare was 15 years ago, she does not follow with neurology and she has never been on any medication. She does have numbness in her feet and lower legs bilaterally, but does not fall, except she had a fall while hiking on ice recently and hurt her right knee.     5. History of melanoma: Needs derm referral    Declines HIV and Hep C       PMHX:     Patient Active Problem List   Diagnosis     Multiple sclerosis (H)     Melanoma of skin (H)     Hx of thyroid cancer     Postablative hypothyroidism     Mixed hyperlipidemia     Benign neoplasm of colon     Thyroid malignant neoplasm (H)     Melanoma of skin of lower limb (H)     Hypothyroidism     Malignant neoplasm of thyroid gland (H)       Current Outpatient Medications   Medication Sig Dispense Refill     atorvastatin (LIPITOR) 10 MG tablet Take 1 tablet (10 mg) by mouth daily 90 tablet 3     calcium carbonate-vitamin D 600-400 MG-UNIT CHEW Take 1 chew tab by mouth 2 times daily 180 tablet 3     levothyroxine (SYNTHROID/LEVOTHROID) 100 MCG tablet Take 1 tablet (100 mcg) by mouth daily 90 tablet 3       Social History     Socioeconomic History     Marital status:      Spouse name: Not on file     Number of children: Not on file     Years of education: Not on file     Highest education level: Not on file   Occupational History      "Occupation: Retired   Social Needs     Financial resource strain: Not on file     Food insecurity:     Worry: Not on file     Inability: Not on file     Transportation needs:     Medical: Not on file     Non-medical: Not on file   Tobacco Use     Smoking status: Never Smoker     Smokeless tobacco: Never Used   Substance and Sexual Activity     Alcohol use: No     Drug use: No     Sexual activity: Never   Lifestyle     Physical activity:     Days per week: Not on file     Minutes per session: Not on file     Stress: Not on file   Relationships     Social connections:     Talks on phone: Not on file     Gets together: Not on file     Attends Anabaptist service: Not on file     Active member of club or organization: Not on file     Attends meetings of clubs or organizations: Not on file     Relationship status: Not on file     Intimate partner violence:     Fear of current or ex partner: Not on file     Emotionally abused: Not on file     Physically abused: Not on file     Forced sexual activity: Not on file   Other Topics Concern     Not on file   Social History Narrative    Education: 2 yr College          Allergies   Allergen Reactions     No Known Allergies        No results found for this or any previous visit (from the past 24 hour(s)).         Review of Systems:   CONSTITUTIONAL:Positive for intentional weight loss  R: NEGATIVE for significant cough or shortness of breath  CV: NEGATIVE for chest pain, palpitations or new or worsening peripheral edema  MUSCULOSKELETAL:Positive for right and left knee pain  P: NEGATIVE for changes in mood or affect          Physical Exam:     Vitals:    10/04/19 0845   BP: (!) 145/93   Pulse: 58   Resp: 16   Temp: 98.6  F (37  C)   TempSrc: Oral   SpO2: 98%   Weight: 81.6 kg (180 lb)   Height: 1.66 m (5' 5.35\")     Body mass index is 29.63 kg/m .    GENERAL APPEARANCE: healthy, alert and no distress,  HENT: ear canals and TM's normal and nose and mouth without ulcers or " lesions  NECK: no adenopathy, no asymmetry, masses, or scars and thyroid normal to palpation  RESP: lungs clear to auscultation - no rales, rhonchi or wheezes  CV: regular rate and rhythm,  and no murmur, click,  rub or gallop  MS: No swelling, warmth or erythema. Pain with external rotation of foot      Assessment and Plan     1. Personal history of malignant neoplasm of thyroid  Will adjust medication if needed  - Thyroglobulin ([facility])  - TSH  Sensitive ([facility])    2. Personal history of malignant melanoma  Due for her follow up  - DERMATOLOGY REFERRAL    3. Acute pain of left knee  Tylenol, ice, elevate as needed  - XR Knee Left 1/2 Views  - MR Knee Left w/o Contrast        Options for treatment and follow-up care were reviewed with the patient and/or guardian. Padma Kelly and/or guardian engaged in the decision making process and verbalized understanding of the options discussed and agreed with the final plan.    Dahiana Rios, DO             external rotation of the foot and flexion

## 2019-10-04 ENCOUNTER — RECORDS - HEALTHEAST (OUTPATIENT)
Dept: ADMINISTRATIVE | Facility: OTHER | Age: 59
End: 2019-10-04

## 2019-10-04 ENCOUNTER — OFFICE VISIT (OUTPATIENT)
Dept: FAMILY MEDICINE | Facility: CLINIC | Age: 59
End: 2019-10-04
Payer: COMMERCIAL

## 2019-10-04 VITALS
DIASTOLIC BLOOD PRESSURE: 93 MMHG | HEART RATE: 58 BPM | OXYGEN SATURATION: 98 % | SYSTOLIC BLOOD PRESSURE: 145 MMHG | HEIGHT: 65 IN | WEIGHT: 180 LBS | TEMPERATURE: 98.6 F | BODY MASS INDEX: 29.99 KG/M2 | RESPIRATION RATE: 16 BRPM

## 2019-10-04 DIAGNOSIS — M25.562 ACUTE PAIN OF LEFT KNEE: ICD-10-CM

## 2019-10-04 DIAGNOSIS — Z85.850 PERSONAL HISTORY OF MALIGNANT NEOPLASM OF THYROID: Primary | ICD-10-CM

## 2019-10-04 DIAGNOSIS — Z85.820 PERSONAL HISTORY OF MALIGNANT MELANOMA: ICD-10-CM

## 2019-10-04 ASSESSMENT — MIFFLIN-ST. JEOR: SCORE: 1387.96

## 2019-10-04 NOTE — PATIENT INSTRUCTIONS
Referral for :     [facility]    LOCATION/PLACE/Provider :    [facility]   DATE & TIME :    Oct. 14th at 2:15 pm   PHONE :     [phone]  FAX :    [phone]  Appointment made by clinic staff/:    [facility]    Referral for :     Dermatology    LOCATION/PLACE/Provider :    [facility]   DATE & TIME :    Nov. 6th at 10 am  PHONE :     [phone]  FAX :    [phone]  Appointment made by clinic staff/:    [facility]  Referral faxed, and also submitted to insurance. Referral entered [id number]

## 2019-10-14 ENCOUNTER — HOSPITAL ENCOUNTER (OUTPATIENT)
Dept: MRI IMAGING | Facility: HOSPITAL | Age: 59
Discharge: HOME | End: 2019-10-14
Attending: FAMILY MEDICINE

## 2019-10-14 DIAGNOSIS — R52 ACUTE PAIN: ICD-10-CM

## 2019-10-17 ENCOUNTER — TELEPHONE (OUTPATIENT)
Dept: FAMILY MEDICINE | Facility: CLINIC | Age: 59
End: 2019-10-17

## 2019-10-17 DIAGNOSIS — M25.562 ACUTE PAIN OF LEFT KNEE: Primary | ICD-10-CM

## 2019-10-18 NOTE — TELEPHONE ENCOUNTER
Referral for :     Orthopedics    LOCATION/PLACE/Provider :    [facility]   DATE & TIME :    Oct. 23rd at 10 am (patient notified)  PHONE :     [phone]  FAX :    [phone]  Appointment made by clinic staff/:    [facility]

## 2019-10-23 ENCOUNTER — TRANSFERRED RECORDS (OUTPATIENT)
Dept: HEALTH INFORMATION MANAGEMENT | Facility: CLINIC | Age: 59
End: 2019-10-23

## 2019-10-23 DIAGNOSIS — Z85.850 PERSONAL HISTORY OF MALIGNANT NEOPLASM OF THYROID: Primary | ICD-10-CM

## 2019-10-24 DIAGNOSIS — Z85.850 PERSONAL HISTORY OF MALIGNANT NEOPLASM OF THYROID: ICD-10-CM

## 2019-10-24 LAB — TSH SERPL DL<=0.05 MIU/L-ACNC: 0.27 UIU/ML (ref 0.3–5)

## 2019-11-06 ENCOUNTER — TRANSFERRED RECORDS (OUTPATIENT)
Dept: HEALTH INFORMATION MANAGEMENT | Facility: CLINIC | Age: 59
End: 2019-11-06

## 2020-03-05 DIAGNOSIS — E89.0 POSTOPERATIVE HYPOTHYROIDISM: ICD-10-CM

## 2020-03-06 RX ORDER — LEVOTHYROXINE SODIUM 100 UG/1
100 TABLET ORAL DAILY
Qty: 90 TABLET | Refills: 1 | Status: SHIPPED | OUTPATIENT
Start: 2020-03-06 | End: 2020-09-24

## 2020-04-27 DIAGNOSIS — E78.2 MIXED HYPERLIPIDEMIA: ICD-10-CM

## 2020-04-27 RX ORDER — ATORVASTATIN CALCIUM 10 MG/1
10 TABLET, FILM COATED ORAL DAILY
Qty: 90 TABLET | Refills: 3 | Status: SHIPPED | OUTPATIENT
Start: 2020-04-27 | End: 2021-04-26

## 2020-09-02 ENCOUNTER — TRANSFERRED RECORDS (OUTPATIENT)
Dept: HEALTH INFORMATION MANAGEMENT | Facility: CLINIC | Age: 60
End: 2020-09-02
Payer: COMMERCIAL

## 2020-09-02 LAB — NEGATIVE: NORMAL

## 2020-09-24 DIAGNOSIS — E89.0 POSTOPERATIVE HYPOTHYROIDISM: ICD-10-CM

## 2020-09-24 NOTE — TELEPHONE ENCOUNTER
Message to physician: From [facility] see contact field.    Date of last visit: 10/4/2019     Date of next visit if scheduled: None    Last Comprehensive Metabolic Panel:  Sodium   Date Value Ref Range Status   04/05/2019 146.0 (H) 133.0 - 144.0 mmol/L Final     Potassium   Date Value Ref Range Status   04/05/2019 4.3 3.4 - 5.3 mmol/L Final     Chloride   Date Value Ref Range Status   04/05/2019 103.0 94.0 - 109.0 mmol/L Final     Carbon Dioxide   Date Value Ref Range Status   04/05/2019 29.0 20.0 - 32.0 mmol/L Final     Glucose   Date Value Ref Range Status   04/05/2019 96.0 60.0 - 109.0 mg/dL Final     Urea Nitrogen   Date Value Ref Range Status   04/05/2019 9.0 7.0 - 30.0 mg/dL Final     Creatinine   Date Value Ref Range Status   04/05/2019 0.6 0.6 - 1.3 mg/dL Final     Calcium   Date Value Ref Range Status   04/05/2019 9.6 8.5 - 10.4 mg/dL Final       BP Readings from Last 3 Encounters:   10/04/19 (!) 145/93   04/05/19 (!) 152/91   03/16/18 137/87       No results found for: A1C             Please complete refill and CLOSE ENCOUNTER.  Closing the encounter signifies the refill is complete.

## 2020-09-28 RX ORDER — LEVOTHYROXINE SODIUM 100 UG/1
100 TABLET ORAL DAILY
Qty: 90 TABLET | Refills: 3 | Status: SHIPPED | OUTPATIENT
Start: 2020-09-28 | End: 2021-09-08

## 2020-11-03 ENCOUNTER — TELEPHONE (OUTPATIENT)
Dept: FAMILY MEDICINE | Facility: CLINIC | Age: 60
End: 2020-11-03

## 2020-11-03 DIAGNOSIS — Z85.820 HISTORY OF MALIGNANT MELANOMA OF SKIN: ICD-10-CM

## 2020-11-03 DIAGNOSIS — C43.9 MELANOMA OF SKIN (H): Primary | ICD-10-CM

## 2020-11-03 NOTE — TELEPHONE ENCOUNTER
[facility] phone call message - order or referral request for patient:     Order or referral being requested: dermatology consultant      Additional Comments: Patient states needing a new referral for dermatology consultant. Patient was advise OFV may be needed since it has been a year since patient been in clinic and patient states PCP wont need to see her PCP will put in referral as usual. Please call when order is in.     OK to leave a message on voice mail? Yes       Primary language: English      needed? No    Call taken on November 3, 2020 at 8:53 AM by Tyler Nunez

## 2020-11-05 NOTE — TELEPHONE ENCOUNTER
She has been seeing [facility]ology [facility] for her history of melanoma. Please place a new referral. She has had a difficulty in the past as the referral need to be placed a certain way.

## 2020-11-09 ENCOUNTER — TRANSFERRED RECORDS (OUTPATIENT)
Dept: HEALTH INFORMATION MANAGEMENT | Facility: CLINIC | Age: 60
End: 2020-11-09

## 2020-12-21 ENCOUNTER — TRANSFERRED RECORDS (OUTPATIENT)
Dept: HEALTH INFORMATION MANAGEMENT | Facility: CLINIC | Age: 60
End: 2020-12-21

## 2021-01-05 PROBLEM — C44.310 BCC (BASAL CELL CARCINOMA), FACE: Status: ACTIVE | Noted: 2021-01-05

## 2021-03-20 ENCOUNTER — IMMUNIZATION (OUTPATIENT)
Dept: FAMILY MEDICINE | Facility: CLINIC | Age: 61
End: 2021-03-20
Payer: COMMERCIAL

## 2021-03-20 PROCEDURE — 91301 PR COVID VAC MODERNA 100 MCG/0.5 ML IM: CPT

## 2021-03-20 PROCEDURE — 0011A PR COVID VAC MODERNA 100 MCG/0.5 ML IM: CPT

## 2021-04-17 ENCOUNTER — IMMUNIZATION (OUTPATIENT)
Dept: FAMILY MEDICINE | Facility: CLINIC | Age: 61
End: 2021-04-17
Attending: FAMILY MEDICINE
Payer: COMMERCIAL

## 2021-04-17 PROCEDURE — 91301 PR COVID VAC MODERNA 100 MCG/0.5 ML IM: CPT

## 2021-04-17 PROCEDURE — 0012A PR COVID VAC MODERNA 100 MCG/0.5 ML IM: CPT

## 2021-04-26 DIAGNOSIS — E78.2 MIXED HYPERLIPIDEMIA: ICD-10-CM

## 2021-04-26 NOTE — TELEPHONE ENCOUNTER
Message to physician:     Date of last visit: 10/04/2019    Date of next visit if scheduled: none    Last Comprehensive Metabolic Panel:  Sodium   Date Value Ref Range Status   04/05/2019 146.0 (H) 133.0 - 144.0 mmol/L Final     Potassium   Date Value Ref Range Status   04/05/2019 4.3 3.4 - 5.3 mmol/L Final     Chloride   Date Value Ref Range Status   04/05/2019 103.0 94.0 - 109.0 mmol/L Final     Carbon Dioxide   Date Value Ref Range Status   04/05/2019 29.0 20.0 - 32.0 mmol/L Final     Glucose   Date Value Ref Range Status   04/05/2019 96.0 60.0 - 109.0 mg/dL Final     Urea Nitrogen   Date Value Ref Range Status   04/05/2019 9.0 7.0 - 30.0 mg/dL Final     Creatinine   Date Value Ref Range Status   04/05/2019 0.6 0.6 - 1.3 mg/dL Final     Calcium   Date Value Ref Range Status   04/05/2019 9.6 8.5 - 10.4 mg/dL Final       BP Readings from Last 3 Encounters:   10/04/19 (!) 145/93   04/05/19 (!) 152/91   03/16/18 137/87       No results found for: A1C             Please complete refill and CLOSE ENCOUNTER.  Closing the encounter signifies the refill is complete.

## 2021-04-27 ENCOUNTER — TRANSFERRED RECORDS (OUTPATIENT)
Dept: HEALTH INFORMATION MANAGEMENT | Facility: CLINIC | Age: 61
End: 2021-04-27

## 2021-04-27 RX ORDER — ATORVASTATIN CALCIUM 10 MG/1
10 TABLET, FILM COATED ORAL DAILY
Qty: 90 TABLET | Refills: 3 | Status: SHIPPED | OUTPATIENT
Start: 2021-04-27 | End: 2022-05-11

## 2021-05-30 VITALS — HEIGHT: 65 IN | BODY MASS INDEX: 30.26 KG/M2 | WEIGHT: 181.6 LBS

## 2021-06-02 ENCOUNTER — RECORDS - HEALTHEAST (OUTPATIENT)
Dept: ADMINISTRATIVE | Facility: CLINIC | Age: 61
End: 2021-06-02

## 2021-06-05 ENCOUNTER — RECORDS - HEALTHEAST (OUTPATIENT)
Dept: ENDOCRINOLOGY | Facility: CLINIC | Age: 61
End: 2021-06-05

## 2021-06-05 DIAGNOSIS — C73 MALIGNANT NEOPLASM OF THYROID GLAND (H): ICD-10-CM

## 2021-06-08 NOTE — PROGRESS NOTES
[facility]  ENDOCRINOLOGY    Thyroid Note  1/22/2017    Padma Kelly, 1960, [id number]          Reason for visit      1. Postsurgical hypothyroidism    2. 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 old female who presents for follow up.  SUMMARY:  1. Follicular variant of Thyroid cancer diagnosed 5/6/2014. She had total thyroidectomy done on 5/6/2014 by Dr Osorio. Initially the patient had an incidental thyroid nodule which resulted in a thyroid ultrasound and then an UG-FNA.   At diagnosis she had no symptoms of dysphonia, dysphagia and odynophagia. She is on levothyroxine 112mcg daily. WHARTON therapy was done 8/7 2014-75mCi. Post scan on 8/13 showed residual uptake in the neck of 2.2% compatible with remnant thyroid tissue.   Risk factors for thyroid cancer were absent.   Pathology on 5/6/2014 report was as follows:   Tumor Laterality: Left Lobe   Tumor size: 0.9cm   Histologic Type: Follicular variant of Papillary Thyroid carcinoma   Histologic Negative   Capsule: focally present   Lymphovascular invasion: NONE   Extrathyroidal invasion: Focally present   STAGING:oQ2V2Vf   MACIS SCORE: 5.67= 99% 20 yr survival   6month f/up ultrasound in January 2015 showed: a small round lesion in the right thyroid bed as noted below. Her thyroglobulin by mass spec was 0.7 ng per ml on January 15, 2015.Thyroid cancer was initially intermediate risk based on focal extra thyroid invasion.   March 2015: neck ultrasound in March 2015 shown a decrease in the size of the lesion in the right thyroid bed 2 7 x 3 x 4 mm. Recent 12 month old body scan was done on July 23, 2015. It showed 1.6% uptake but not localized to the neck. There was an indeterminate uptake in the mediastinum but not concerning    TODAY:  Padma is here today as a EVY from Dr Montenegro, who provides the above narrative.  She is feeling well and has no concerns at present.  She has no complaints  "attributable to her neck, no difficulty swallowing nor feelings of compression.  Her TSH is 0.13, which is a good level post Papillary CA treatment.  Her tumor marker shows no elevation.    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    Social History      reports that she has never smoked. She does not have any smokeless tobacco history on file.      Review of Systems     Patient denies fatigue, weight changes, heat/cold intolerance, bowel/skin changes or CVS symptoms.   Remainder per HPI and per attached intake form.      Vital Signs     Visit Vitals     /78 (Patient Site: Right Arm, Patient Position: Sitting, Cuff Size: Adult Regular)     Pulse 70     Ht 5' 5\" (1.651 m)     Wt 181 lb 9.6 oz (82.4 kg)     BMI 30.22 kg/m2     Wt Readings from Last 3 Encounters:   01/19/17 181 lb 9.6 oz (82.4 kg)   01/19/16 178 lb 6.4 oz (80.9 kg)   08/06/15 163 lb (73.9 kg)       Physical Exam     General:  Normal, NIRD,appears euthyroid  Eyes:  Pupils equal, round and reactive to light; no proptosis, lid lag or  periorbital edema.  Thyroid:  Thyroid is normal.  No tenderness or bruit  Neck: No lymph nodes  Musculoskeletal:  Muscle strength grossly normal without evidence of wasting.  Heart:  Regular rate and rhythm without murmur.  Lungs:  Clear to auscultation.  Abdomen: Soft, non-tender, no masses or organomegaly  Neuro: Patella Reflexes were normal.No tremors  Skin:  No acanthosis nigricans or vitiligo        Assessment     1. Postsurgical hypothyroidism    2. 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  Will continue to see her every year with refills.  " Will do another Neck US in 1 year.  Time spent with pt, 20 min with >50% spent in counseling and coordination of care.        [facility]  1/22/2017  12:06 PM      Lab Results     TSH   Date Value Ref Range Status   01/12/2017 0.13 (L) 0.30 - 5.00 uIU/mL Final     No results found for: THYROIDAB    No results found for: T7TTPYG    Imaging Results   Last thyroid ultrasound:  Results for orders placed during the hospital encounter of 01/12/17   US Thyroid    Narrative US THYROID  1/12/2017 9:36 AM    INDICATION: Malignant neoplasm of thyroid gland  TECHNIQUE: Routine.  COMPARISON: 01/12/2016.    FINDINGS:  The thyroid is surgically absent. Again noted is a rounded hypoechoic area in the right thyroid. Measuring 6 x 6 x 5 mm. On the prior study it was measured at 6 x 4 mm head and has not significantly changed. This may represent an area of postoperative   scarring. There are small benign-appearing lymph nodes with fatty aidee all measure less than 1 cm in short axis. There is no evidence for cervical lymphadenopathy.      Impression CONCLUSION:  1.  Status post thyroidectomy. Small hypoechoic area in the right thyroid bed is not significantly changed from the prior study.  2.  No cervical lymphadenopathy.       Last thyroid nuclear scan:  No results found for this or any previous visit.    Current Medications     Outpatient Medications Prior to Visit   Medication Sig Dispense Refill     CALCIUM CARBONATE ORAL Take 1 tablet by mouth 2 (two) times a day. (1000 mg each tablet)        DOCOSAHEXANOIC ACID/EPA (FISH OIL ORAL) 600 mg oral capsule    Take 1 capsule daily       levothyroxine (SYNTHROID, LEVOTHROID) 100 MCG tablet Take 1 tablet (100 mcg total) by mouth daily. 30 tablet 6     MULTIVITAMIN ORAL Take 1 tablet by mouth daily.       No facility-administered medications prior to visit.

## 2021-07-21 ENCOUNTER — RECORDS - HEALTHEAST (OUTPATIENT)
Dept: ADMINISTRATIVE | Facility: CLINIC | Age: 61
End: 2021-07-21

## 2021-08-14 ENCOUNTER — HEALTH MAINTENANCE LETTER (OUTPATIENT)
Age: 61
End: 2021-08-14

## 2021-09-07 DIAGNOSIS — E89.0 POSTOPERATIVE HYPOTHYROIDISM: ICD-10-CM

## 2021-09-07 NOTE — TELEPHONE ENCOUNTER
[facility] phone call message- medication clarification/question:    Full Medication Name: levothyroxine (SYNTHROID/LEVOTHROID)   Dose: 100 mcg tablet     Question: Patient will be out soon and will need refills, patient was informed she needed a physical and labs appointment which is scheduled on 10/12 @ 2:20, patient wanting to know also if needing to fast for appointment if so will want morning appointment instead. Call and advise.     Pharmacy confirmed as [id number] - [place], [place] - [address]: Yes    OK to leave a message on voice mail? Yes    Primary language: English      needed? No    Call taken on September 7, 2021 at 11:58 AM by Latia Farley

## 2021-09-09 RX ORDER — LEVOTHYROXINE SODIUM 100 UG/1
100 TABLET ORAL DAILY
Qty: 90 TABLET | Refills: 0 | Status: SHIPPED | OUTPATIENT
Start: 2021-09-09 | End: 2021-12-20

## 2021-10-09 ENCOUNTER — HEALTH MAINTENANCE LETTER (OUTPATIENT)
Age: 61
End: 2021-10-09

## 2021-10-12 ENCOUNTER — OFFICE VISIT (OUTPATIENT)
Dept: FAMILY MEDICINE | Facility: CLINIC | Age: 61
End: 2021-10-12
Payer: COMMERCIAL

## 2021-10-12 VITALS
OXYGEN SATURATION: 99 % | RESPIRATION RATE: 18 BRPM | SYSTOLIC BLOOD PRESSURE: 137 MMHG | BODY MASS INDEX: 28.97 KG/M2 | HEART RATE: 68 BPM | TEMPERATURE: 96 F | DIASTOLIC BLOOD PRESSURE: 84 MMHG | WEIGHT: 176 LBS

## 2021-10-12 DIAGNOSIS — G35 MULTIPLE SCLEROSIS (H): ICD-10-CM

## 2021-10-12 DIAGNOSIS — E78.2 MIXED HYPERLIPIDEMIA: ICD-10-CM

## 2021-10-12 DIAGNOSIS — E89.0 POSTOPERATIVE HYPOTHYROIDISM: ICD-10-CM

## 2021-10-12 DIAGNOSIS — Z11.59 SCREENING FOR VIRAL DISEASE: ICD-10-CM

## 2021-10-12 DIAGNOSIS — Z98.890 HISTORY OF MELANOMA EXCISION: ICD-10-CM

## 2021-10-12 DIAGNOSIS — Z12.31 ENCOUNTER FOR SCREENING MAMMOGRAM FOR BREAST CANCER: ICD-10-CM

## 2021-10-12 DIAGNOSIS — Z78.9 VEGETARIAN DIET: ICD-10-CM

## 2021-10-12 DIAGNOSIS — D12.2 BENIGN NEOPLASM OF ASCENDING COLON: ICD-10-CM

## 2021-10-12 DIAGNOSIS — Z23 NEED FOR PROPHYLACTIC VACCINATION AND INOCULATION AGAINST INFLUENZA: ICD-10-CM

## 2021-10-12 DIAGNOSIS — Z85.820 HISTORY OF MELANOMA EXCISION: ICD-10-CM

## 2021-10-12 DIAGNOSIS — Z85.850 HX OF THYROID CANCER: ICD-10-CM

## 2021-10-12 DIAGNOSIS — Z00.00 ROUTINE GENERAL MEDICAL EXAMINATION AT A HEALTH CARE FACILITY: Primary | ICD-10-CM

## 2021-10-12 LAB
ALBUMIN SERPL-MCNC: 3.8 G/DL (ref 3.5–5)
ALP SERPL-CCNC: 103 U/L (ref 45–120)
ALT SERPL W P-5'-P-CCNC: 30 U/L (ref 0–45)
ANION GAP SERPL CALCULATED.3IONS-SCNC: 11 MMOL/L (ref 5–18)
AST SERPL W P-5'-P-CCNC: 29 U/L (ref 0–40)
BILIRUB SERPL-MCNC: 0.5 MG/DL (ref 0–1)
BUN SERPL-MCNC: 15 MG/DL (ref 8–22)
CALCIUM SERPL-MCNC: 9.4 MG/DL (ref 8.5–10.5)
CHLORIDE BLD-SCNC: 106 MMOL/L (ref 98–107)
CHOLEST SERPL-MCNC: 174 MG/DL
CO2 SERPL-SCNC: 23 MMOL/L (ref 22–31)
CREAT SERPL-MCNC: 0.73 MG/DL (ref 0.6–1.1)
ERYTHROCYTE [DISTWIDTH] IN BLOOD BY AUTOMATED COUNT: 12.9 % (ref 10–15)
FASTING STATUS PATIENT QL REPORTED: NO
GFR SERPL CREATININE-BSD FRML MDRD: 88 ML/MIN/1.73M2
GLUCOSE BLD-MCNC: 99 MG/DL (ref 70–125)
HCT VFR BLD AUTO: 41.3 % (ref 35–47)
HDLC SERPL-MCNC: 67 MG/DL
HGB BLD-MCNC: 14.2 G/DL (ref 11.7–15.7)
HIV 1+2 AB+HIV1 P24 AG SERPL QL IA: NEGATIVE
LDLC SERPL CALC-MCNC: 72 MG/DL
MCH RBC QN AUTO: 30.9 PG (ref 26.5–33)
MCHC RBC AUTO-ENTMCNC: 34.4 G/DL (ref 31.5–36.5)
MCV RBC AUTO: 90 FL (ref 78–100)
PLATELET # BLD AUTO: 311 10E3/UL (ref 150–450)
POTASSIUM BLD-SCNC: 3.7 MMOL/L (ref 3.5–5)
PROT SERPL-MCNC: 7.1 G/DL (ref 6–8)
RBC # BLD AUTO: 4.59 10E6/UL (ref 3.8–5.2)
SODIUM SERPL-SCNC: 140 MMOL/L (ref 136–145)
TRIGL SERPL-MCNC: 177 MG/DL
TSH SERPL DL<=0.005 MIU/L-ACNC: 0.19 UIU/ML (ref 0.3–5)
WBC # BLD AUTO: 6.3 10E3/UL (ref 4–11)

## 2021-10-12 PROCEDURE — 90472 IMMUNIZATION ADMIN EACH ADD: CPT | Performed by: FAMILY MEDICINE

## 2021-10-12 PROCEDURE — 90471 IMMUNIZATION ADMIN: CPT | Performed by: FAMILY MEDICINE

## 2021-10-12 PROCEDURE — 87389 HIV-1 AG W/HIV-1&-2 AB AG IA: CPT | Performed by: FAMILY MEDICINE

## 2021-10-12 PROCEDURE — 90715 TDAP VACCINE 7 YRS/> IM: CPT | Performed by: FAMILY MEDICINE

## 2021-10-12 PROCEDURE — 86803 HEPATITIS C AB TEST: CPT | Performed by: FAMILY MEDICINE

## 2021-10-12 PROCEDURE — 99396 PREV VISIT EST AGE 40-64: CPT | Mod: 25 | Performed by: FAMILY MEDICINE

## 2021-10-12 PROCEDURE — 80053 COMPREHEN METABOLIC PANEL: CPT | Performed by: FAMILY MEDICINE

## 2021-10-12 PROCEDURE — 80061 LIPID PANEL: CPT | Performed by: FAMILY MEDICINE

## 2021-10-12 PROCEDURE — 90682 RIV4 VACC RECOMBINANT DNA IM: CPT | Performed by: FAMILY MEDICINE

## 2021-10-12 PROCEDURE — 84439 ASSAY OF FREE THYROXINE: CPT | Performed by: FAMILY MEDICINE

## 2021-10-12 PROCEDURE — 36415 COLL VENOUS BLD VENIPUNCTURE: CPT | Performed by: FAMILY MEDICINE

## 2021-10-12 PROCEDURE — 84443 ASSAY THYROID STIM HORMONE: CPT | Performed by: FAMILY MEDICINE

## 2021-10-12 PROCEDURE — 85027 COMPLETE CBC AUTOMATED: CPT | Performed by: FAMILY MEDICINE

## 2021-10-12 NOTE — PROGRESS NOTES
Female Physical Note    Concerns today: No special concerns today. Has been 2 years since her last visit to the clinic.    Chronic concerns to review:    1. History of thyroid cancer: Diagnosed in 2014, had surgery and then required ablation due to residual tissue. Has been stable.    2. Skin cancers- melanoma and BCC- [facility]-    3. Multiple Sclerosis: Diagnosed at age 25. Has not been to see a neurologist in many years. She takes no medications. She has numbness from her waist down, but really no change in her function or sensation in many years. Discussed just seeing a doctor for possible treatment options.  Derm referral  mammogram    HCM- colonoscopy- done, needs mammogram [place] location, pap test, needs Tdap and flu vaccines    ROS:  CONSTITUTIONAL: no fatigue, no unexpected change in weight  SKIN: Positive for tanned skin, multiple nevis  EYES: Positive for right eye watering and redness  ENT: no ear problems, no mouth problems, no throat problems  RESP: no significant cough, no shortness of breath  CV: no chest pain, no palpitations, no new or worsening peripheral edema  GI: no nausea, no vomiting, no constipation, no diarrhea    Sexually Active: No   No LMP recorded. Menopausal   STD History: Neg  Last Pap Smear Date: Years ago, normal  Abnormal Pap History: None    Patient Active Problem List   Diagnosis     Multiple sclerosis (H)     Hx of thyroid cancer     Postablative hypothyroidism     Mixed hyperlipidemia     Benign neoplasm of colon     Hypothyroidism     BCC (basal cell carcinoma), face       Current Outpatient Medications   Medication Sig Dispense Refill     atorvastatin (LIPITOR) 10 MG tablet Take 1 tablet (10 mg) by mouth daily 90 tablet 3     calcium carbonate-vitamin D 600-400 MG-UNIT CHEW Take 1 chew tab by mouth 2 times daily 180 tablet 3     levothyroxine (SYNTHROID/LEVOTHROID) 100 MCG tablet Take 1 tablet (100 mcg) by mouth daily 90 tablet 0        Past Medical History:   Diagnosis Date     Hypothyroidism      Melanoma of skin of lower limb (H)      Multiple sclerosis (H)      Thyroid malignant neoplasm (H)             Problem List Medication List and Allergy List were reviewed.    Patient is an established patient of this clinic..    Social History     Tobacco Use     Smoking status: Never Smoker     Smokeless tobacco: Never Used   Substance Use Topics     Alcohol use: No     Single  Children ? no    Has anyone hurt you physically, for example by pushing, hitting, slapping or kicking you or forcing you to have sex? Denies  Do you feel threatened or controlled by a partner, ex-partner or anyone in your life? Denies    RISK BEHAVIORS AND HEALTHY HABITS:  Tobacco Use/Smoking: None  Illicit Drug Use: None  Do you use alcohol? No    Immunization History   Administered Date(s) Administered     COVID-19,PF,Moderna 03/20/2021, 04/17/2021     Influenza Intranasal Vaccine 10/26/2020     Influenza Quad, Recombinant, pf(RIV4) (Flublok) 10/12/2021     Influenza Vaccine IM > 6 months Valent IIV4 (Alfuria,Fluzone) 11/26/2018     TDAP Vaccine (Adacel) 02/04/2009     Tdap (Adacel,Boostrix) 10/12/2021    Reviewed Immunization Record Today    EXAMINATION:   /84   Pulse 68   Temp (!) 96  F (35.6  C) (Tympanic)   Resp 18   Wt 79.8 kg (176 lb)   SpO2 99%   BMI 28.97 kg/m    GENERAL: healthy, alert and no distress  EYES: Eyes grossly normal to inspection, extraocular movements - intact, and PERRL  HENT: ear canals- normal; TMs- normal; Nose- normal; Mouth- no ulcers, no lesions  NECK: no tenderness, no adenopathy, no asymmetry, no masses, no stiffness; thyroid- normal to palpation  RESP: lungs clear to auscultation - no rales, no rhonchi, no wheezes  CV: regular rates and rhythm, normal S1 S2, no S3 or S4 and no murmur, no click or rub -  ABDOMEN: soft, no tenderness, no  hepatosplenomegaly, no masses, normal bowel sounds  MS: extremities- no gross deformities  noted, no edema  SKIN: no suspicious lesions, no rashes  NEURO: strength and tone- normal, sensory exam- grossly normal, mentation- intact, speech- normal, reflexes- symmetric  BACK: no CVA tenderness, no paralumbar tenderness  PSYCH: Alert and oriented times 3; speech- coherent , normal rate and volume; able to articulate logical thoughts, able to abstract reason, no tangential thoughts, no hallucinations or delusions, affect- normal    Results for orders placed or performed in visit on 10/12/21   TSH with free T4 reflex     Status: Abnormal   Result Value Ref Range    TSH 0.19 (L) 0.30 - 5.00 uIU/mL   HIV Antigen Antibody Combo     Status: Normal   Result Value Ref Range    HIV Antigen Antibody Combo Negative Negative   Hepatitis C antibody     Status: Normal   Result Value Ref Range    Hepatitis C Antibody Negative Negative    Narrative    Assay performance characteristics have not been established for newborns, infants, children (<18 years) or populations of immunocompromised or immunosuppressed patients.    Comprehensive metabolic panel     Status: Normal   Result Value Ref Range    Sodium 140 136 - 145 mmol/L    Potassium 3.7 3.5 - 5.0 mmol/L    Chloride 106 98 - 107 mmol/L    Carbon Dioxide (CO2) 23 22 - 31 mmol/L    Anion Gap 11 5 - 18 mmol/L    Urea Nitrogen 15 8 - 22 mg/dL    Creatinine 0.73 0.60 - 1.10 mg/dL    Calcium 9.4 8.5 - 10.5 mg/dL    Glucose 99 70 - 125 mg/dL    Alkaline Phosphatase 103 45 - 120 U/L    AST 29 0 - 40 U/L    ALT 30 0 - 45 U/L    Protein Total 7.1 6.0 - 8.0 g/dL    Albumin 3.8 3.5 - 5.0 g/dL    Bilirubin Total 0.5 0.0 - 1.0 mg/dL    GFR Estimate 88 >60 mL/min/1.73m2   Lipid Profile     Status: Abnormal   Result Value Ref Range    Cholesterol 174 <=199 mg/dL    Triglycerides 177 (H) <=149 mg/dL    Direct Measure HDL 67 >=50 mg/dL    LDL Cholesterol Calculated 72 <=129 mg/dL    Patient Fasting > 8hrs? No    CBC with platelets     Status: Normal   Result Value Ref Range    WBC Count  6.3 4.0 - 11.0 10e3/uL    RBC Count 4.59 3.80 - 5.20 10e6/uL    Hemoglobin 14.2 11.7 - 15.7 g/dL    Hematocrit 41.3 35.0 - 47.0 %    MCV 90 78 - 100 fL    MCH 30.9 26.5 - 33.0 pg    MCHC 34.4 31.5 - 36.5 g/dL    RDW 12.9 10.0 - 15.0 %    Platelet Count 311 150 - 450 10e3/uL   T4 free     Status: Normal   Result Value Ref Range    Free T4 1.27 0.70 - 1.80 ng/dL         Assessment/Plan:    1. Routine general medical examination at a health care facility  Will up date vaccines today. Patient declined pap test. She is low risk and prefers not to have the exam done.    2. Benign neoplasm of ascending colon  Up to date with colonoscopy, will repeat in 7 years    3. Hx of thyroid cancer  - TSH with free T4 reflex; Future  - TSH with free T4 reflex  - T4 free    4. Multiple sclerosis (H)  - Adult Neurology Referral; Future    5. Screening for viral disease  - HIV Antigen Antibody Combo; Future  - Hepatitis C antibody; Future  - HIV Antigen Antibody Combo  - Hepatitis C antibody    6. Mixed hyperlipidemia  Continue with healthy diet and exercise  - Comprehensive metabolic panel; Future  - Lipid Profile; Future  - CBC with platelets; Future  - Comprehensive metabolic panel  - Lipid Profile  - CBC with platelets    7. Postoperative hypothyroidism  Will adjust medications as needed  - TSH with free T4 reflex; Future  - TSH with free T4 reflex  - T4 free    8. Vegetarian diet  - CBC with platelets; Future  - CBC with platelets    9. Need for prophylactic vaccination and inoculation against influenza  - INFLUENZA QUAD, RECOMBINANT, P-FREE (RIV4) (FLUBLOK)    10. Encounter for screening mammogram for breast cancer  Would like to do at [place] location  - MA SCREENING DIGITAL BILAT; Future    11. History of melanoma excision  Has appointment scheduled at [facility]- needs referral placed  - Adult Dermatology Referral; Future      Dahiana Rios D.O.

## 2021-10-13 LAB
HCV AB SERPL QL IA: NEGATIVE
T4 FREE SERPL-MCNC: 1.27 NG/DL (ref 0.7–1.8)

## 2021-10-14 PROBLEM — C43.9 MELANOMA OF SKIN (H): Status: RESOLVED | Noted: 2017-04-10 | Resolved: 2021-10-14

## 2021-10-14 PROBLEM — C73 MALIGNANT NEOPLASM OF THYROID GLAND (H): Status: RESOLVED | Noted: 2019-04-05 | Resolved: 2021-10-14

## 2021-11-01 ENCOUNTER — TRANSFERRED RECORDS (OUTPATIENT)
Dept: HEALTH INFORMATION MANAGEMENT | Facility: CLINIC | Age: 61
End: 2021-11-01
Payer: COMMERCIAL

## 2021-11-22 ENCOUNTER — IMMUNIZATION (OUTPATIENT)
Dept: FAMILY MEDICINE | Facility: CLINIC | Age: 61
End: 2021-11-22
Payer: COMMERCIAL

## 2021-11-22 PROCEDURE — 0064A COVID-19,PF,MODERNA (18+ YRS BOOSTER .25ML): CPT

## 2021-11-22 PROCEDURE — 91306 COVID-19,PF,MODERNA (18+ YRS BOOSTER .25ML): CPT

## 2021-12-20 DIAGNOSIS — E89.0 POSTOPERATIVE HYPOTHYROIDISM: ICD-10-CM

## 2021-12-21 RX ORDER — LEVOTHYROXINE SODIUM 100 UG/1
100 TABLET ORAL DAILY
Qty: 90 TABLET | Refills: 3 | Status: SHIPPED | OUTPATIENT
Start: 2021-12-21 | End: 2022-12-06

## 2022-01-21 ENCOUNTER — HOSPITAL ENCOUNTER (OUTPATIENT)
Dept: MAMMOGRAPHY | Facility: CLINIC | Age: 62
Discharge: HOME | End: 2022-01-21
Attending: FAMILY MEDICINE | Admitting: FAMILY MEDICINE
Payer: COMMERCIAL

## 2022-01-21 DIAGNOSIS — Z12.31 ENCOUNTER FOR SCREENING MAMMOGRAM FOR BREAST CANCER: ICD-10-CM

## 2022-01-21 PROCEDURE — 77067 SCR MAMMO BI INCL CAD: CPT

## 2022-02-22 ENCOUNTER — MYC MEDICAL ADVICE (OUTPATIENT)
Dept: FAMILY MEDICINE | Facility: CLINIC | Age: 62
End: 2022-02-22
Payer: COMMERCIAL

## 2022-02-22 DIAGNOSIS — Z53.9 ERRONEOUS ENCOUNTER--DISREGARD: Primary | ICD-10-CM

## 2022-05-11 DIAGNOSIS — E78.2 MIXED HYPERLIPIDEMIA: ICD-10-CM

## 2022-05-12 RX ORDER — ATORVASTATIN CALCIUM 10 MG/1
10 TABLET, FILM COATED ORAL DAILY
Qty: 90 TABLET | Refills: 3 | Status: SHIPPED | OUTPATIENT
Start: 2022-05-12 | End: 2023-03-08

## 2022-09-11 ENCOUNTER — HEALTH MAINTENANCE LETTER (OUTPATIENT)
Age: 62
End: 2022-09-11

## 2022-11-01 ENCOUNTER — TRANSFERRED RECORDS (OUTPATIENT)
Dept: HEALTH INFORMATION MANAGEMENT | Facility: CLINIC | Age: 62
End: 2022-11-01

## 2022-12-05 ENCOUNTER — OFFICE VISIT (OUTPATIENT)
Dept: FAMILY MEDICINE | Facility: CLINIC | Age: 62
End: 2022-12-05
Payer: COMMERCIAL

## 2022-12-05 VITALS
BODY MASS INDEX: 29.3 KG/M2 | SYSTOLIC BLOOD PRESSURE: 148 MMHG | WEIGHT: 178 LBS | DIASTOLIC BLOOD PRESSURE: 80 MMHG | RESPIRATION RATE: 16 BRPM | OXYGEN SATURATION: 98 % | HEART RATE: 70 BPM

## 2022-12-05 DIAGNOSIS — Z23 HIGH PRIORITY FOR 2019-NCOV VACCINE: ICD-10-CM

## 2022-12-05 DIAGNOSIS — Z85.850 HX OF THYROID CANCER: ICD-10-CM

## 2022-12-05 DIAGNOSIS — Z78.9 VEGETARIAN DIET: ICD-10-CM

## 2022-12-05 DIAGNOSIS — G35 MULTIPLE SCLEROSIS (H): Primary | ICD-10-CM

## 2022-12-05 DIAGNOSIS — E78.2 MIXED HYPERLIPIDEMIA: ICD-10-CM

## 2022-12-05 DIAGNOSIS — D12.2 BENIGN NEOPLASM OF ASCENDING COLON: ICD-10-CM

## 2022-12-05 DIAGNOSIS — Z98.890 HISTORY OF MELANOMA EXCISION: ICD-10-CM

## 2022-12-05 DIAGNOSIS — Z23 NEED FOR PROPHYLACTIC VACCINATION AND INOCULATION AGAINST INFLUENZA: ICD-10-CM

## 2022-12-05 DIAGNOSIS — E89.0 POSTOPERATIVE HYPOTHYROIDISM: ICD-10-CM

## 2022-12-05 DIAGNOSIS — Z85.820 HISTORY OF MELANOMA EXCISION: ICD-10-CM

## 2022-12-05 DIAGNOSIS — C44.310 BCC (BASAL CELL CARCINOMA), FACE: ICD-10-CM

## 2022-12-05 LAB
ALBUMIN SERPL BCG-MCNC: 4.3 G/DL (ref 3.5–5.2)
ALP SERPL-CCNC: 99 U/L (ref 35–104)
ALT SERPL W P-5'-P-CCNC: 33 U/L (ref 10–35)
ANION GAP SERPL CALCULATED.3IONS-SCNC: 14 MMOL/L (ref 7–15)
AST SERPL W P-5'-P-CCNC: 37 U/L (ref 10–35)
BILIRUB SERPL-MCNC: 0.7 MG/DL
BUN SERPL-MCNC: 13 MG/DL (ref 8–23)
CALCIUM SERPL-MCNC: 9.4 MG/DL (ref 8.8–10.2)
CHLORIDE SERPL-SCNC: 101 MMOL/L (ref 98–107)
CHOLEST SERPL-MCNC: 190 MG/DL
CREAT SERPL-MCNC: 0.82 MG/DL (ref 0.51–0.95)
DEPRECATED HCO3 PLAS-SCNC: 23 MMOL/L (ref 22–29)
ERYTHROCYTE [DISTWIDTH] IN BLOOD BY AUTOMATED COUNT: 12.8 % (ref 10–15)
GFR SERPL CREATININE-BSD FRML MDRD: 79 ML/MIN/1.73M2
GLUCOSE SERPL-MCNC: 86 MG/DL (ref 70–99)
HCT VFR BLD AUTO: 44.6 % (ref 35–47)
HDLC SERPL-MCNC: 78 MG/DL
HGB BLD-MCNC: 14.9 G/DL (ref 11.7–15.7)
LDLC SERPL CALC-MCNC: 94 MG/DL
MCH RBC QN AUTO: 31 PG (ref 26.5–33)
MCHC RBC AUTO-ENTMCNC: 33.4 G/DL (ref 31.5–36.5)
MCV RBC AUTO: 93 FL (ref 78–100)
NONHDLC SERPL-MCNC: 112 MG/DL
PLATELET # BLD AUTO: 278 10E3/UL (ref 150–450)
POTASSIUM SERPL-SCNC: 3.9 MMOL/L (ref 3.4–5.3)
PROT SERPL-MCNC: 6.9 G/DL (ref 6.4–8.3)
RBC # BLD AUTO: 4.81 10E6/UL (ref 3.8–5.2)
SODIUM SERPL-SCNC: 138 MMOL/L (ref 136–145)
TRIGL SERPL-MCNC: 91 MG/DL
TSH SERPL DL<=0.005 MIU/L-ACNC: 0.56 UIU/ML (ref 0.3–4.2)
WBC # BLD AUTO: 5.1 10E3/UL (ref 4–11)

## 2022-12-05 PROCEDURE — 80061 LIPID PANEL: CPT | Performed by: FAMILY MEDICINE

## 2022-12-05 PROCEDURE — 84443 ASSAY THYROID STIM HORMONE: CPT | Performed by: FAMILY MEDICINE

## 2022-12-05 PROCEDURE — 80053 COMPREHEN METABOLIC PANEL: CPT | Performed by: FAMILY MEDICINE

## 2022-12-05 PROCEDURE — 85027 COMPLETE CBC AUTOMATED: CPT | Performed by: FAMILY MEDICINE

## 2022-12-05 PROCEDURE — 99214 OFFICE O/P EST MOD 30 MIN: CPT | Mod: 25 | Performed by: FAMILY MEDICINE

## 2022-12-05 PROCEDURE — 91313 COVID-19 VACCINE BIVALENT BOOSTER 18+ (MODERNA): CPT | Performed by: FAMILY MEDICINE

## 2022-12-05 PROCEDURE — 0134A COVID-19 VACCINE BIVALENT BOOSTER 18+ (MODERNA): CPT | Performed by: FAMILY MEDICINE

## 2022-12-05 PROCEDURE — 90682 RIV4 VACC RECOMBINANT DNA IM: CPT | Performed by: FAMILY MEDICINE

## 2022-12-05 PROCEDURE — 90471 IMMUNIZATION ADMIN: CPT | Performed by: FAMILY MEDICINE

## 2022-12-05 PROCEDURE — 36415 COLL VENOUS BLD VENIPUNCTURE: CPT | Performed by: FAMILY MEDICINE

## 2022-12-05 RX ORDER — LEVOTHYROXINE SODIUM 100 UG/1
100 TABLET ORAL DAILY
Qty: 90 TABLET | Refills: 3 | Status: CANCELLED | OUTPATIENT
Start: 2022-12-05

## 2022-12-05 NOTE — PROGRESS NOTES
Assessment & Plan     Multiple sclerosis (H)  Patient is not on any medications. She has been offered neurology consult many times and she has declined. States her disease process has been stable for 20 years with no changes. She is a fall risk as she does not feel her legs.    BCC (basal cell carcinoma), face  Follows with [facility].    Hx of thyroid cancer  Thyroid removed in 2014. TSH today. Try to push the TSH lower, however she admits to intermittent sensation of increased heart rate, this has happened twice during exertion and once at rest. Discussed 30 day monitor but unsure if would catch palpitation/increased HR since happens so infrequently.    Benign neoplasm of ascending colon  Due for colonoscopy 2028      History of melanoma excision  Follows with [facility] yearly    Postoperative hypothyroidism  - TSH with free T4 reflex    Mixed hyperlipidemia  - Lipid Profile  - Comprehensive metabolic panel  - CBC with platelets    Continue on atorvastatin. Blood pressure is running high in clinic, but within normal range at home. Will not start medications today. Continue to be active and healthy diet.    Need for prophylactic vaccination and inoculation against influenza  - INFLUENZA QUAD, RECOMBINANT, P-FREE (RIV4) (FLUBLOK)    High priority for 2019-nCoV vaccine  - COVID-19,PF,MODERNA BIVALENT 18+Yrs          {Provider  Link to [facility] Help Grid :[id number]}         No follow-ups on file.    Dahiana Rios DO  [facility]    Nnamdi Rouse is a 62 year old, presenting for the following health issues:  Refill Request (Thyroid medication), Imm/Inj (Flu Shot), and Imm/Inj (COVID-19 VACCINE)      HPI     1. Skin lesions: History of melanoma in 2014 and multiple BCC removed. Derm appointment on 11/1/22. Had skin lesion removal and left preauricular was BCC and treated with Mohs. Also had wide excision of left arm.     2. History of thyroid cancer: Diagnosed in  2014, had surgery and then required ablation due to residual tissue. Has been stable. Now has post-ablative hypothyroid. Needs refill of her levothyroxine.    3. Multiple Sclerosis: Diagnosed at age 25. Has not been to see a neurologist in many years. She takes no medications. She has numbness from her waist down, but really no change in her function or sensation in many years. Has 2 falls this year already.     4. Blood pressure: Checks at home, 120/80 and within normal range.    Wt Readings from Last 2 Encounters:   12/05/22 80.7 kg (178 lb)   10/12/21 79.8 kg (176 lb)         Plan to go on to Medicare when she turns 63 in the summer.         Objective    BP (!) 148/80   Pulse 70   Resp 16   Wt 80.7 kg (178 lb)   SpO2 98%   BMI 29.30 kg/m    Body mass index is 29.3 kg/m .  Physical Exam   GENERAL: healthy, alert and no distress  EYES: Eyes grossly normal to inspection, PERRL and conjunctivae and sclerae normal  HENT: ear canals and TM's normal  NECK: no adenopathy, no asymmetry, absent thyroid  RESP: lungs clear to auscultation - no rales, rhonchi or wheezes  CV: regular rate and rhythm, normal S1 S2, no S3 or S4, no murmur, click or rub, no peripheral edema and peripheral pulses strong  MS: no gross musculoskeletal defects noted, no edema, left forearm with steri-istrips in place

## 2022-12-05 NOTE — PATIENT INSTRUCTIONS
Please check with your insurance about Shingles vaccine. If currently covered, return to clinic for vaccine prior to turning 63.    We will see you next fall for your Welcome to Medicare Visit, bone density and pneumonia vaccine.

## 2022-12-06 RX ORDER — LEVOTHYROXINE SODIUM 100 UG/1
100 TABLET ORAL DAILY
Qty: 90 TABLET | Refills: 3 | Status: SHIPPED | OUTPATIENT
Start: 2022-12-06 | End: 2023-11-16

## 2023-01-23 ENCOUNTER — HEALTH MAINTENANCE LETTER (OUTPATIENT)
Age: 63
End: 2023-01-23

## 2023-03-08 DIAGNOSIS — E78.2 MIXED HYPERLIPIDEMIA: ICD-10-CM

## 2023-03-08 RX ORDER — ATORVASTATIN CALCIUM 10 MG/1
10 TABLET, FILM COATED ORAL DAILY
Qty: 90 TABLET | Refills: 3 | Status: SHIPPED | OUTPATIENT
Start: 2023-03-08 | End: 2023-05-10

## 2023-05-10 DIAGNOSIS — E78.2 MIXED HYPERLIPIDEMIA: ICD-10-CM

## 2023-05-10 RX ORDER — ATORVASTATIN CALCIUM 10 MG/1
10 TABLET, FILM COATED ORAL DAILY
Qty: 90 TABLET | Refills: 3 | Status: SHIPPED | OUTPATIENT
Start: 2023-05-10 | End: 2024-07-11

## 2023-10-07 ENCOUNTER — HEALTH MAINTENANCE LETTER (OUTPATIENT)
Age: 63
End: 2023-10-07

## 2023-11-16 DIAGNOSIS — E89.0 POSTOPERATIVE HYPOTHYROIDISM: ICD-10-CM

## 2023-11-16 RX ORDER — LEVOTHYROXINE SODIUM 100 UG/1
100 TABLET ORAL DAILY
Qty: 90 TABLET | Refills: 0 | Status: SHIPPED | OUTPATIENT
Start: 2023-11-16 | End: 2023-11-27 | Stop reason: ALTCHOICE

## 2023-11-16 NOTE — TELEPHONE ENCOUNTER
"Message to physician: none    Date of last visit: 12/5/2022    Date of next visit if scheduled: 11/27/23    Potassium   Date Value Ref Range Status   12/05/2022 3.9 3.4 - 5.3 mmol/L Final   10/12/2021 3.7 3.5 - 5.0 mmol/L Final   04/05/2019 4.3 3.4 - 5.3 mmol/L Final     Creatinine   Date Value Ref Range Status   12/05/2022 0.82 0.51 - 0.95 mg/dL Final   04/05/2019 0.6 0.6 - 1.3 mg/dL Final     GFR Estimate   Date Value Ref Range Status   12/05/2022 79 >60 mL/min/1.73m2 Final     Comment:     Effective December 21, 2021 eGFRcr in adults is calculated using the 2021 CKD-EPI creatinine equation which includes age and gender (Lynda et al., NEJM, DOI: 10.1056/OQKNsr1781644)       BP Readings from Last 3 Encounters:   12/05/22 (!) 148/80   10/12/21 137/84   10/04/19 (!) 145/93       No results found for: \"A1C\"    Please complete refill and CLOSE ENCOUNTER.  Closing the encounter signifies the refill is complete.    "

## 2023-11-27 ENCOUNTER — OFFICE VISIT (OUTPATIENT)
Dept: FAMILY MEDICINE | Facility: CLINIC | Age: 63
End: 2023-11-27
Payer: COMMERCIAL

## 2023-11-27 ENCOUNTER — TELEPHONE (OUTPATIENT)
Dept: FAMILY MEDICINE | Facility: CLINIC | Age: 63
End: 2023-11-27

## 2023-11-27 VITALS
HEART RATE: 68 BPM | OXYGEN SATURATION: 99 % | BODY MASS INDEX: 30.14 KG/M2 | WEIGHT: 180.9 LBS | TEMPERATURE: 98.4 F | SYSTOLIC BLOOD PRESSURE: 151 MMHG | RESPIRATION RATE: 16 BRPM | HEIGHT: 65 IN | DIASTOLIC BLOOD PRESSURE: 83 MMHG

## 2023-11-27 DIAGNOSIS — N95.9 POST MENOPAUSAL PROBLEMS: ICD-10-CM

## 2023-11-27 DIAGNOSIS — Z12.31 ENCOUNTER FOR SCREENING MAMMOGRAM FOR BREAST CANCER: ICD-10-CM

## 2023-11-27 DIAGNOSIS — Z23 NEED FOR PROPHYLACTIC VACCINATION AND INOCULATION AGAINST INFLUENZA: ICD-10-CM

## 2023-11-27 DIAGNOSIS — C44.310 BCC (BASAL CELL CARCINOMA), FACE: ICD-10-CM

## 2023-11-27 DIAGNOSIS — Z98.890 HISTORY OF MELANOMA EXCISION: ICD-10-CM

## 2023-11-27 DIAGNOSIS — E78.2 MIXED HYPERLIPIDEMIA: ICD-10-CM

## 2023-11-27 DIAGNOSIS — R03.0 ELEVATED BLOOD PRESSURE READING IN OFFICE WITH WHITE COAT SYNDROME, WITHOUT DIAGNOSIS OF HYPERTENSION: ICD-10-CM

## 2023-11-27 DIAGNOSIS — Z85.850 HX OF THYROID CANCER: ICD-10-CM

## 2023-11-27 DIAGNOSIS — Z85.820 HISTORY OF MELANOMA EXCISION: ICD-10-CM

## 2023-11-27 DIAGNOSIS — G35 MULTIPLE SCLEROSIS (H): ICD-10-CM

## 2023-11-27 DIAGNOSIS — E89.0 POSTOPERATIVE HYPOTHYROIDISM: Primary | ICD-10-CM

## 2023-11-27 LAB
ALBUMIN SERPL BCG-MCNC: 4.4 G/DL (ref 3.5–5.2)
ALP SERPL-CCNC: 96 U/L (ref 40–150)
ALT SERPL W P-5'-P-CCNC: 32 U/L (ref 0–50)
ANION GAP SERPL CALCULATED.3IONS-SCNC: 10 MMOL/L (ref 7–15)
AST SERPL W P-5'-P-CCNC: 32 U/L (ref 0–45)
BILIRUB SERPL-MCNC: 0.6 MG/DL
BUN SERPL-MCNC: 16.9 MG/DL (ref 8–23)
CALCIUM SERPL-MCNC: 9.6 MG/DL (ref 8.8–10.2)
CHLORIDE SERPL-SCNC: 103 MMOL/L (ref 98–107)
CHOLEST SERPL-MCNC: 185 MG/DL
CREAT SERPL-MCNC: 0.76 MG/DL (ref 0.51–0.95)
DEPRECATED HCO3 PLAS-SCNC: 27 MMOL/L (ref 22–29)
EGFRCR SERPLBLD CKD-EPI 2021: 86 ML/MIN/1.73M2
ERYTHROCYTE [DISTWIDTH] IN BLOOD BY AUTOMATED COUNT: 12.8 % (ref 10–15)
GLUCOSE SERPL-MCNC: 72 MG/DL (ref 70–99)
HCT VFR BLD AUTO: 46.6 % (ref 35–47)
HDLC SERPL-MCNC: 67 MG/DL
HGB BLD-MCNC: 15.3 G/DL (ref 11.7–15.7)
LDLC SERPL CALC-MCNC: 97 MG/DL
MCH RBC QN AUTO: 30.8 PG (ref 26.5–33)
MCHC RBC AUTO-ENTMCNC: 32.8 G/DL (ref 31.5–36.5)
MCV RBC AUTO: 94 FL (ref 78–100)
NONHDLC SERPL-MCNC: 118 MG/DL
PLATELET # BLD AUTO: 288 10E3/UL (ref 150–450)
POTASSIUM SERPL-SCNC: 4.2 MMOL/L (ref 3.4–5.3)
PROT SERPL-MCNC: 7.3 G/DL (ref 6.4–8.3)
RBC # BLD AUTO: 4.96 10E6/UL (ref 3.8–5.2)
SODIUM SERPL-SCNC: 140 MMOL/L (ref 135–145)
TRIGL SERPL-MCNC: 107 MG/DL
TSH SERPL DL<=0.005 MIU/L-ACNC: 2.27 UIU/ML (ref 0.3–4.2)
WBC # BLD AUTO: 5.9 10E3/UL (ref 4–11)

## 2023-11-27 PROCEDURE — 85027 COMPLETE CBC AUTOMATED: CPT | Performed by: FAMILY MEDICINE

## 2023-11-27 PROCEDURE — 99214 OFFICE O/P EST MOD 30 MIN: CPT | Mod: 25 | Performed by: FAMILY MEDICINE

## 2023-11-27 PROCEDURE — 80061 LIPID PANEL: CPT | Performed by: FAMILY MEDICINE

## 2023-11-27 PROCEDURE — 84443 ASSAY THYROID STIM HORMONE: CPT | Performed by: FAMILY MEDICINE

## 2023-11-27 PROCEDURE — 80053 COMPREHEN METABOLIC PANEL: CPT | Performed by: FAMILY MEDICINE

## 2023-11-27 PROCEDURE — 36415 COLL VENOUS BLD VENIPUNCTURE: CPT | Performed by: FAMILY MEDICINE

## 2023-11-27 PROCEDURE — 90480 ADMN SARSCOV2 VAC 1/ONLY CMP: CPT | Performed by: FAMILY MEDICINE

## 2023-11-27 PROCEDURE — 91320 SARSCV2 VAC 30MCG TRS-SUC IM: CPT | Performed by: FAMILY MEDICINE

## 2023-11-27 PROCEDURE — 90662 IIV NO PRSV INCREASED AG IM: CPT | Performed by: FAMILY MEDICINE

## 2023-11-27 PROCEDURE — G0008 ADMIN INFLUENZA VIRUS VAC: HCPCS | Performed by: FAMILY MEDICINE

## 2023-11-27 RX ORDER — LEVOTHYROXINE SODIUM 112 UG/1
112 TABLET ORAL DAILY
Qty: 90 TABLET | Refills: 1 | Status: SHIPPED | OUTPATIENT
Start: 2023-11-27 | End: 2024-05-21

## 2023-11-27 NOTE — PROGRESS NOTES
"  Assessment & Plan     Postoperative hypothyroidism  - TSH with free T4 reflex  - Increase to levothyroxine (SYNTHROID/LEVOTHROID) 112 MCG tablet  Dispense: 90 tablet; Refill: 1    Mixed hyperlipidemia  Continue with statin therapy  - Lipid Profile    Multiple sclerosis (H)  Has not seen a neurology specialist in many years. Concerned for recent falls and urinary frequency may be contributing to worsening MS  - Comprehensive metabolic panel  - CBC with platelets  - MR Brain w Contrast  - MRI CERVICAL SPINE W/O CONTRAST  - MRI LUMBAR SPINE W/O CONTRAST    Hx of thyroid cancer  On check of TSH, was greater than 2, plan to increase her levothyroxine    History of melanoma excision  Follows with dermatology and has full skin exam scheduled for 1/2024    BCC (basal cell carcinoma), face  Follows with dermatology and has full skin exam scheduled for 1/2024    Need for prophylactic vaccination and inoculation against influenza  Accepted     Encounter for screening mammogram for breast cancer  - MA SCREENING DIGITAL BILAT    Post menopausal problems  Due for routine check, but important due to recent falls  - DEXA HIP/PELVIS/SPINE - Future    Elevated blood pressure reading in office with white coat syndrome, without diagnosis of hypertension  Always elevated in clinic, but within normal range at home               BMI:   Estimated body mass index is 30.1 kg/m  as calculated from the following:    Height as of this encounter: 1.651 m (5' 5\").    Weight as of this encounter: 82.1 kg (180 lb 14.4 oz).           No follow-ups on file.    DO YAMILE Oh [facility] [place] Subjective Amy is a 63 year old, presenting for the following health issues:  Recheck Medication (Refill medication) and Imm/Inj (Flu Shot)      HPI   [facility]      Matters  Code Status: No Order      ADVANCED CARE DIRECTIVES not on file          No data to display                 Medications  Medications of Risk:    " Patient has no Opioid in Med List Patient has no Benzodiazepines on Med List Patient has no Antipsychotics on Med List  Number of medications on med list:   Med List Contains Less than 10 Medications     Mentation  Patient has cognitive impairment diagnosis on Problem List  Patient does not have depression on Problem List    PHQ-2 Score:       11/27/2023     9:25 AM 12/5/2022     8:02 AM   PHQ-2 ( 1999 [employer])   Q1: Little interest or pleasure in doing things 0 0   Q2: Feeling down, depressed or hopeless 0 0   PHQ-2 Score 0 0   Q1: Little interest or pleasure in doing things Not at all    Q2: Feeling down, depressed or hopeless Not at all    PHQ-2 Score 0       PHQ9x3        No data to display              Mini Cog:        No data to display                Mobility  Refreshable SmartLink to last Falls Risk assessment score and date:   No data recorded   Does Patient or Caregiver have mobility concerns for the patient?: Yes  Adaptive Equipment/DME that patient uses: No DME    General Risk Score: 1   Values used to calculate this score:    Points  Metrics       1        Age: 63       0        Hospital Admissions: 0       0        ED Visits: 0       0        Has Chronic Obstructive Pulmonary Disease: No       0        Has Diabetes: No       0        Has Congestive Heart Failure: No       0        Has Liver Disease: No       0        Has Depression: No       0        Current PCP: Dahiana Rios, DO       0        Has Medicaid: No     Patient Health status: Chronically ill: functionally independent, greater or equal than three chronic conditions          Vaccines- received COVID19 and flu vaccine  Bone density  Advanced directives    Multiple Sclerosis: Diagnosed many years ago. Has not been to the neurologist in 20 years. Is not taking medications. She states her symptoms are stable. She has numbness in her bilateral lower legs. No vision issues. Has urinary frequency    2  Hyperlipidemia: Has been tolerating a statin.  She stays very active and enjoys hiking and golfing. She follows a vegetarian diet for many years.    3. Hypothyroid, postsurgical, due to thyroid cancer that was diagnosed in 2014.    4. History of skin cancers- melanoma in situ- removed from her leg and multiple BCC. She follows with Dr. Elidia Barnes and has an appointment scheduled for whole body check on 1/2024.  Recent falls    5. Elevated blood pressure in clinic: Checked blood pressure at [facility] and was 114/70. Has blood pressure cuff at home and checks at home and is always within normal range.    6. Urinary frequency: Sometimes does not make it to the bathroom, does not need to use bathroom at night though, wears a pad. Does not seem to have as much of an issue with stress incontinence. She does drink a fair amount of diet coke.    Review of Systems         Objective    BP (!) 149/81   Pulse 68   Resp 16   Wt 82.1 kg (180 lb 14.4 oz)   SpO2 99%   BMI 29.78 kg/m    Body mass index is 29.78 kg/m .  Physical Exam   GENERAL: healthy, alert and no distress  NECK: no adenopathy, no asymmetry, masses, or scars and thyroid normal to palpation  RESP: lungs clear to auscultation - no rales, rhonchi or wheezes  CV: regular rate and rhythm, normal S1 S2, no S3 or S4, no murmur, click or rub, no peripheral edema and peripheral pulses strong  MS: no gross musculoskeletal defects noted, no edema

## 2023-11-27 NOTE — PATIENT INSTRUCTIONS
Please get your Shingles, RSV, Pneumonia vaccine at your pharmacy due to your Medicare coverage.      Please call [phone] (option 1) to schedule your mammogram.     Schedule MRI, bone density and mammogram at [facility]

## 2023-11-28 NOTE — TELEPHONE ENCOUNTER
Spoke to patient regarding her TSH level and need to increase her levothyroxine. She checked her blood pressure at home and was 121/67.

## 2023-12-11 ENCOUNTER — HOSPITAL ENCOUNTER (OUTPATIENT)
Dept: MRI IMAGING | Facility: CLINIC | Age: 63
Discharge: HOME | End: 2023-12-11
Attending: FAMILY MEDICINE
Payer: COMMERCIAL

## 2023-12-11 ENCOUNTER — HOSPITAL ENCOUNTER (OUTPATIENT)
Dept: BONE DENSITY | Facility: CLINIC | Age: 63
Discharge: HOME | End: 2023-12-11
Attending: FAMILY MEDICINE
Payer: COMMERCIAL

## 2023-12-11 DIAGNOSIS — G35 MULTIPLE SCLEROSIS (H): ICD-10-CM

## 2023-12-11 DIAGNOSIS — N95.9 POST MENOPAUSAL PROBLEMS: ICD-10-CM

## 2023-12-11 PROCEDURE — 72156 MRI NECK SPINE W/O & W/DYE: CPT

## 2023-12-11 PROCEDURE — 72148 MRI LUMBAR SPINE W/O DYE: CPT

## 2023-12-11 PROCEDURE — 255N000002 HC RX 255 OP 636: Performed by: FAMILY MEDICINE

## 2023-12-11 PROCEDURE — 77080 DXA BONE DENSITY AXIAL: CPT

## 2023-12-11 PROCEDURE — A9585 GADOBUTROL INJECTION: HCPCS | Performed by: FAMILY MEDICINE

## 2023-12-11 PROCEDURE — 70553 MRI BRAIN STEM W/O & W/DYE: CPT

## 2023-12-11 RX ORDER — GADOBUTROL 604.72 MG/ML
8 INJECTION INTRAVENOUS ONCE
Status: COMPLETED | OUTPATIENT
Start: 2023-12-11 | End: 2023-12-11

## 2023-12-11 RX ADMIN — GADOBUTROL 8 ML: 604.72 INJECTION INTRAVENOUS at 13:36

## 2024-01-09 ENCOUNTER — HOSPITAL ENCOUNTER (OUTPATIENT)
Dept: MAMMOGRAPHY | Facility: CLINIC | Age: 64
Discharge: HOME | End: 2024-01-09
Attending: FAMILY MEDICINE | Admitting: FAMILY MEDICINE
Payer: COMMERCIAL

## 2024-01-09 DIAGNOSIS — Z12.31 ENCOUNTER FOR SCREENING MAMMOGRAM FOR BREAST CANCER: ICD-10-CM

## 2024-01-09 PROCEDURE — 77067 SCR MAMMO BI INCL CAD: CPT

## 2024-01-29 DIAGNOSIS — G35 MULTIPLE SCLEROSIS (H): Primary | ICD-10-CM

## 2024-01-29 NOTE — PROGRESS NOTES
Will order thoracic MRI and neurology appointment for her MS. Had a cervical MR that showed hyperintense cord signal abnormality and needs thoracic MR.

## 2024-02-01 ENCOUNTER — HOSPITAL ENCOUNTER (OUTPATIENT)
Dept: MAMMOGRAPHY | Facility: CLINIC | Age: 64
Discharge: HOME | End: 2024-02-01
Attending: FAMILY MEDICINE
Payer: COMMERCIAL

## 2024-02-01 ENCOUNTER — HOSPITAL ENCOUNTER (OUTPATIENT)
Dept: ULTRASOUND IMAGING | Facility: CLINIC | Age: 64
Discharge: HOME | End: 2024-02-01
Attending: FAMILY MEDICINE
Payer: COMMERCIAL

## 2024-02-01 DIAGNOSIS — R92.8 ABNORMAL MAMMOGRAM: ICD-10-CM

## 2024-02-01 PROCEDURE — 76642 ULTRASOUND BREAST LIMITED: CPT | Mod: LT

## 2024-02-01 PROCEDURE — 77065 DX MAMMO INCL CAD UNI: CPT | Mod: LT

## 2024-05-18 DIAGNOSIS — E89.0 POSTOPERATIVE HYPOTHYROIDISM: ICD-10-CM

## 2024-05-21 RX ORDER — LEVOTHYROXINE SODIUM 112 UG/1
112 TABLET ORAL DAILY
Qty: 90 TABLET | Refills: 1 | Status: SHIPPED | OUTPATIENT
Start: 2024-05-21

## 2024-07-11 DIAGNOSIS — E78.2 MIXED HYPERLIPIDEMIA: ICD-10-CM

## 2024-07-11 RX ORDER — ATORVASTATIN CALCIUM 10 MG/1
10 TABLET, FILM COATED ORAL DAILY
Qty: 90 TABLET | Refills: 0 | Status: SHIPPED | OUTPATIENT
Start: 2024-07-11 | End: 2024-10-04

## 2024-10-04 DIAGNOSIS — E78.2 MIXED HYPERLIPIDEMIA: ICD-10-CM

## 2024-10-04 RX ORDER — ATORVASTATIN CALCIUM 10 MG/1
10 TABLET, FILM COATED ORAL DAILY
Qty: 90 TABLET | Refills: 0 | Status: SHIPPED | OUTPATIENT
Start: 2024-10-04

## 2024-11-08 ENCOUNTER — OFFICE VISIT (OUTPATIENT)
Dept: FAMILY MEDICINE | Facility: CLINIC | Age: 64
End: 2024-11-08
Payer: COMMERCIAL

## 2024-11-08 VITALS
SYSTOLIC BLOOD PRESSURE: 136 MMHG | RESPIRATION RATE: 16 BRPM | HEART RATE: 60 BPM | OXYGEN SATURATION: 99 % | BODY MASS INDEX: 28.51 KG/M2 | HEIGHT: 66 IN | DIASTOLIC BLOOD PRESSURE: 85 MMHG | WEIGHT: 177.4 LBS

## 2024-11-08 DIAGNOSIS — E89.0 POSTOPERATIVE HYPOTHYROIDISM: ICD-10-CM

## 2024-11-08 DIAGNOSIS — Z86.006 PERSONAL HISTORY OF MELANOMA IN-SITU: ICD-10-CM

## 2024-11-08 DIAGNOSIS — G35 MULTIPLE SCLEROSIS (H): ICD-10-CM

## 2024-11-08 DIAGNOSIS — Z87.828 HISTORY OF TEAR OF MENISCUS OF KNEE JOINT: ICD-10-CM

## 2024-11-08 DIAGNOSIS — M25.562 ACUTE PAIN OF LEFT KNEE: ICD-10-CM

## 2024-11-08 DIAGNOSIS — E89.0 POSTOPERATIVE HYPOTHYROIDISM: Primary | ICD-10-CM

## 2024-11-08 DIAGNOSIS — Z85.850 HX OF THYROID CANCER: ICD-10-CM

## 2024-11-08 DIAGNOSIS — E78.2 MIXED HYPERLIPIDEMIA: ICD-10-CM

## 2024-11-08 DIAGNOSIS — Z12.31 ENCOUNTER FOR SCREENING MAMMOGRAM FOR BREAST CANCER: ICD-10-CM

## 2024-11-08 LAB
ERYTHROCYTE [DISTWIDTH] IN BLOOD BY AUTOMATED COUNT: 12.6 % (ref 10–15)
HCT VFR BLD AUTO: 45.6 % (ref 35–47)
HGB BLD-MCNC: 15 G/DL (ref 11.7–15.7)
MCH RBC QN AUTO: 30.3 PG (ref 26.5–33)
MCHC RBC AUTO-ENTMCNC: 32.9 G/DL (ref 31.5–36.5)
MCV RBC AUTO: 92 FL (ref 78–100)
PLATELET # BLD AUTO: 315 10E3/UL (ref 150–450)
RBC # BLD AUTO: 4.95 10E6/UL (ref 3.8–5.2)
WBC # BLD AUTO: 7.3 10E3/UL (ref 4–11)

## 2024-11-08 PROCEDURE — 80053 COMPREHEN METABOLIC PANEL: CPT | Performed by: FAMILY MEDICINE

## 2024-11-08 PROCEDURE — 90662 IIV NO PRSV INCREASED AG IM: CPT | Performed by: FAMILY MEDICINE

## 2024-11-08 PROCEDURE — G0008 ADMIN INFLUENZA VIRUS VAC: HCPCS | Performed by: FAMILY MEDICINE

## 2024-11-08 PROCEDURE — 84443 ASSAY THYROID STIM HORMONE: CPT | Performed by: FAMILY MEDICINE

## 2024-11-08 PROCEDURE — 36415 COLL VENOUS BLD VENIPUNCTURE: CPT | Performed by: FAMILY MEDICINE

## 2024-11-08 PROCEDURE — G0009 ADMIN PNEUMOCOCCAL VACCINE: HCPCS | Performed by: FAMILY MEDICINE

## 2024-11-08 PROCEDURE — 84439 ASSAY OF FREE THYROXINE: CPT | Performed by: FAMILY MEDICINE

## 2024-11-08 PROCEDURE — 80061 LIPID PANEL: CPT | Performed by: FAMILY MEDICINE

## 2024-11-08 PROCEDURE — 90677 PCV20 VACCINE IM: CPT | Performed by: FAMILY MEDICINE

## 2024-11-08 PROCEDURE — 99214 OFFICE O/P EST MOD 30 MIN: CPT | Mod: 25 | Performed by: FAMILY MEDICINE

## 2024-11-08 PROCEDURE — 85027 COMPLETE CBC AUTOMATED: CPT | Performed by: FAMILY MEDICINE

## 2024-11-08 RX ORDER — LEVOTHYROXINE SODIUM 112 UG/1
112 TABLET ORAL DAILY
Qty: 90 TABLET | Refills: 0 | Status: SHIPPED | OUTPATIENT
Start: 2024-11-08 | End: 2024-11-16

## 2024-11-08 NOTE — PROGRESS NOTES
Prior to immunization administration, verified patients identity using patient s name and date of birth. Please see Immunization Activity for additional information.     Screening Questionnaire for Adult Immunization    Are you sick today?   No   Do you have allergies to medications, food, a vaccine component or latex?   No   Have you ever had a serious reaction after receiving a vaccination?   No   Do you have a long-term health problem with heart, lung, kidney, or metabolic disease (e.g., diabetes), asthma, a blood disorder, no spleen, complement component deficiency, a cochlear implant, or a spinal fluid leak?  Are you on long-term aspirin therapy?   No   Do you have cancer, leukemia, HIV/AIDS, or any other immune system problem?   No   Do you have a parent, brother, or sister with an immune system problem?   No   In the past 3 months, have you taken medications that affect  your immune system, such as prednisone, other steroids, or anticancer drugs; drugs for the treatment of rheumatoid arthritis, Crohn s disease, or psoriasis; or have you had radiation treatments?   No   Have you had a seizure, or a brain or other nervous system problem?   No   During the past year, have you received a transfusion of blood or blood    products, or been given immune (gamma) globulin or antiviral drug?   No   For women: Are you pregnant or is there a chance you could become       pregnant during the next month?   No   Have you received any vaccinations in the past 4 weeks?   No     Immunization questionnaire answers were all negative.      Patient instructed to remain in clinic for 15 minutes afterwards, and to report any adverse reactions.     Screening performed by Marlene Persaud on 11/8/2024 at 12:15 PM.

## 2024-11-08 NOTE — PROGRESS NOTES
"  Assessment & Plan     Postoperative hypothyroidism  - TSH with free T4 reflex  - T4 free    Will adjust medications as needed    Mixed hyperlipidemia  On low dose atorvastatin  - Comprehensive metabolic panel  - Lipid Profile  - CBC with platelets    Multiple sclerosis (H)  Has not seen a neurologist in many years  - Adult Neurology  Referral    Hx of thyroid cancer  10 years removed from surgical intervention    Personal history of melanoma in-situ  10 years removed from surgical intervention  Follows with dermatology    Acute pain of left knee  Discussed wearing a brace with activity, consider steroid injection, may need to repeat MRI if buckling symptoms persists. Consider orthopedic consult.    Encounter for screening mammogram for breast cancer  Was called back for more imaging last year  - MA Screening Bilateral w/ Aamir      The longitudinal plan of care for the diagnosis(es)/condition(s) as documented were addressed during this visit. Due to the added complexity in care, I will continue to support Padma in the subsequent management and with ongoing continuity of care.      BMI  Estimated body mass index is 28.63 kg/m  as calculated from the following:    Height as of this encounter: 1.676 m (5' 6\").    Weight as of this encounter: 80.5 kg (177 lb 6.4 oz).         {FOLLOW UP PLANS (Optional) Includes COVID19 Treatment Plan:135064}    No follow-ups on file.    Subjective   Padma is a 64 year old, presenting for the following health issues:  Refill Request (Discuss med)  {(!) Visit Details have not yet been documented.  Please enter Visit Details and then use this list to pull in documentation. (Optional):817048}  HPI   {Patient is 65+ yrs, please consider 4Ms documentation (Optional):307940}  {MA/LPN/RN Pre-Provider Visit Orders- hCG/UA/Strep (Optional):587591}  {SUPERLIST (Optional):003835}  {additonal problems for provider to add (Optional):333048}    History of melanoma in situ on lower leg: Continues " "to struggle with BCC. Follows with dermatology. 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 Dermatology appointment in 2/2025    Multiple Sclerosis:  Neurology appointment was to be in June, but then cancelled as it was not with a MS specialist.   Has not seen a neurology specialist in many years.   Has had some falls since she does not feel her feet very well. Also has had some urinary incontinence. Did have imaging 12/23 that showed chronic demyelination, but no new concerns.     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 Had total thyroidectomy. Has remained on levothyroxine,    Left knee: uncomfortable for 2-3 weeks. Has happened in the past. In 2019 had a MRI of left knee showing tears in the medial and lateral meniscus and loss of cartilage. She has been doing well up until just a few weeks ago. She is concerned about her lateral movement as it feels unsteady and it may buckle. She is active and pickleball season starts soon,     {Kartela (Optional):562485}      Objective    /85   Pulse 60   Resp 16   Ht 1.676 m (5' 6\")   Wt 80.5 kg (177 lb 6.4 oz)   SpO2 99%   BMI 28.63 kg/m    Body mass index is 28.63 kg/m .      Wt Readings from Last 2 Encounters:   11/08/24 80.5 kg (177 lb 6.4 oz)   11/27/23 82.1 kg (180 lb 14.4 oz)       Physical Exam   GENERAL: alert and no distress  RESP: lungs clear to auscultation - no rales, rhonchi or wheezes  CV: regular rate and rhythm, normal S1 S2, no S3 or S4, no murmur, click or rub, no peripheral edema  MS: no gross musculoskeletal defects noted, no edema    {Diagnostic Test Results (Optional):441074}        Signed Electronically by: Dahiana Rios DO  {Email feedback regarding this note to [email or web address]   :451681}  "

## 2024-11-09 LAB
ALBUMIN SERPL BCG-MCNC: 4.3 G/DL (ref 3.5–5.2)
ALP SERPL-CCNC: 108 U/L (ref 40–150)
ALT SERPL W P-5'-P-CCNC: 40 U/L (ref 0–50)
ANION GAP SERPL CALCULATED.3IONS-SCNC: 12 MMOL/L (ref 7–15)
AST SERPL W P-5'-P-CCNC: 26 U/L (ref 0–45)
BILIRUB SERPL-MCNC: 0.6 MG/DL
BUN SERPL-MCNC: 15.2 MG/DL (ref 8–23)
CALCIUM SERPL-MCNC: 9.4 MG/DL (ref 8.8–10.4)
CHLORIDE SERPL-SCNC: 102 MMOL/L (ref 98–107)
CHOLEST SERPL-MCNC: 183 MG/DL
CREAT SERPL-MCNC: 0.76 MG/DL (ref 0.51–0.95)
EGFRCR SERPLBLD CKD-EPI 2021: 86 ML/MIN/1.73M2
FASTING STATUS PATIENT QL REPORTED: NORMAL
FASTING STATUS PATIENT QL REPORTED: NORMAL
GLUCOSE SERPL-MCNC: 97 MG/DL (ref 70–99)
HCO3 SERPL-SCNC: 27 MMOL/L (ref 22–29)
HDLC SERPL-MCNC: 65 MG/DL
LDLC SERPL CALC-MCNC: 92 MG/DL
NONHDLC SERPL-MCNC: 118 MG/DL
POTASSIUM SERPL-SCNC: 4.1 MMOL/L (ref 3.4–5.3)
PROT SERPL-MCNC: 7.2 G/DL (ref 6.4–8.3)
SODIUM SERPL-SCNC: 141 MMOL/L (ref 135–145)
T4 FREE SERPL-MCNC: 1.77 NG/DL (ref 0.9–1.7)
TRIGL SERPL-MCNC: 129 MG/DL
TSH SERPL DL<=0.005 MIU/L-ACNC: 0.28 UIU/ML (ref 0.3–4.2)

## 2024-11-11 PROBLEM — Z86.006 PERSONAL HISTORY OF MELANOMA IN-SITU: Status: ACTIVE | Noted: 2024-11-11

## 2024-11-16 RX ORDER — LEVOTHYROXINE SODIUM 112 UG/1
112 TABLET ORAL DAILY
Qty: 90 TABLET | Refills: 0 | Status: SHIPPED | OUTPATIENT
Start: 2024-11-16

## 2024-11-17 NOTE — PROGRESS NOTES
Decrease levothyroxine to 112mcg 6 out of 7 days per week. 100mcg daily was not enough, but 112mcg daily seems to be too much.   Will repeat labs in 8-12 weeks.    Will place orthopedic consult for left knee. Having instability with hs of meniscal tears in 2019.    Dahiana Rios, DO

## 2024-11-20 ENCOUNTER — PATIENT OUTREACH (OUTPATIENT)
Dept: CARE COORDINATION | Facility: CLINIC | Age: 64
End: 2024-11-20
Payer: COMMERCIAL

## 2024-11-30 ENCOUNTER — HEALTH MAINTENANCE LETTER (OUTPATIENT)
Age: 64
End: 2024-11-30

## 2024-12-05 ENCOUNTER — TRANSCRIBE ORDERS (OUTPATIENT)
Dept: OTHER | Age: 64
End: 2024-12-05

## 2024-12-05 DIAGNOSIS — Z96.652 S/P TOTAL KNEE ARTHROPLASTY, LEFT: Primary | ICD-10-CM

## 2024-12-10 ENCOUNTER — PATIENT OUTREACH (OUTPATIENT)
Dept: CARE COORDINATION | Facility: CLINIC | Age: 64
End: 2024-12-10
Payer: COMMERCIAL

## 2024-12-11 ENCOUNTER — TELEPHONE (OUTPATIENT)
Dept: FAMILY MEDICINE | Facility: CLINIC | Age: 64
End: 2024-12-11
Payer: COMMERCIAL

## 2024-12-11 DIAGNOSIS — N39.0 UTI (URINARY TRACT INFECTION), UNCOMPLICATED: Primary | ICD-10-CM

## 2024-12-11 RX ORDER — SULFAMETHOXAZOLE AND TRIMETHOPRIM 800; 160 MG/1; MG/1
1 TABLET ORAL 2 TIMES DAILY
Qty: 6 TABLET | Refills: 0 | Status: SHIPPED | OUTPATIENT
Start: 2024-12-11

## 2024-12-11 NOTE — TELEPHONE ENCOUNTER
Patient called in with symptoms of UTI. She would like antibiotics sent to her pharmacy. Unable to get to clinic today. Has been using cranberry juice.     Will send in antibiotics, if no improvement, please Return to clinic for further evaluation.  Dahiana Rios, DO

## 2024-12-30 DIAGNOSIS — E78.2 MIXED HYPERLIPIDEMIA: ICD-10-CM

## 2024-12-30 RX ORDER — ATORVASTATIN CALCIUM 10 MG/1
10 TABLET, FILM COATED ORAL DAILY
Qty: 90 TABLET | Refills: 0 | Status: SHIPPED | OUTPATIENT
Start: 2024-12-30

## 2025-01-01 NOTE — PROGRESS NOTES
Preoperative Evaluation  [facility] [place] [address] [place] [address]  Phone: [phone]  Fax: [phone]  Primary Provider: Dahiana Rios DO  Pre-op Performing Provider: Dahiana Rios DO  Jan 2, 2025 1/2/2025   Surgical Information   What procedure is being done? Left TKA   Facility or Hospital where procedure/surgery will be performed: [place], [place]   Who is doing the procedure / surgery? Dr. Sudarshan Alcantara   Date of surgery / procedure: 1/16/2025   Time of surgery / procedure: 1:00 pm   Where do you plan to recover after surgery? at home with family     Fax number for surgical facility: Note does not need to be faxed, will be available electronically in Epic.    Plan for anesthesia: spinal  Assessment & Plan     The proposed surgical procedure is considered INTERMEDIATE risk.    Preop general physical exam  Medically optimized for procedure    Primary osteoarthritis of left knee  - CBC with platelets    Postoperative hypothyroidism  Will adjust medications as needed  - TSH with free T4 reflex    Mixed hyperlipidemia  -Recent labs stable, taking statin as directed    Multiple sclerosis (H)  Has appointment next month for her MS. Has not been evaluated in a long time. Not on any medications. Does plan for spinal anesthesia. She does not have much feeling in her lower legs (below the knee) due to her MS, but does not ambulate with cane/walker.     Personal history of melanoma in-situ  Follows with dermatology regularly.    Hx of thyroid cancer  10 years ago, no recurrence, thyroid labs pending    Full code status  - Full Code        - No identified additional risk factors other than previously addressed    Preoperative Medication Instructions  Antiplatelet or Anticoagulation Medication Instructions   - Patient is on no antiplatelet or anticoagulation medications.    Additional Medication Instructions  Take all scheduled medications on the day of surgery  (after the procedure).    Recommendation  Approval given to proceed with proposed procedure, without further diagnostic evaluation.    Nnamdi Rouse is a 64 year old, presenting for the following:  Pre-Op Exam (Preop knee replacement on 1/16)        HPI related to upcoming procedure: 2-3 months of knee pain and sensation of buckling. She is active and enjoys hiking and pickleball. In 2019, she had a MRI of left knee showing tears in the medial and lateral meniscus and loss of cartilage. Has met with orthopedics and plans for total left knee replacement.        1/2/2025   Pre-Op Questionnaire   Have you ever had a heart attack or stroke? No   Have you ever had surgery on your heart or blood vessels, such as a stent placement, a coronary artery bypass, or surgery on an artery in your head, neck, heart, or legs? No   Do you have chest pain with activity? No   Do you have a history of heart failure? No   Do you currently have a cold, bronchitis or symptoms of other infection? No   Do you have a cough, shortness of breath, or wheezing? No   Do you or anyone in your family have previous history of blood clots? No   Do you or does anyone in your family have a serious bleeding problem such as prolonged bleeding following surgeries or cuts? No   Have you ever had problems with anemia or been told to take iron pills? No   Have you had any abnormal blood loss such as black, tarry or bloody stools, or abnormal vaginal bleeding? No   Have you ever had a blood transfusion? No   Are you willing to have a blood transfusion if it is medically needed before, during, or after your surgery? Yes   Have you or any of your relatives ever had problems with anesthesia? No   Do you have sleep apnea, excessive snoring or daytime drowsiness? No   Do you have any artifical heart valves or other implanted medical devices like a pacemaker, defibrillator, or continuous glucose monitor? No   Do you have artificial joints? No   Are you allergic  to latex? No     Health Care Directive  Patient does not have a Health Care Directive: Patient states has Advance Directive and will bring in a copy to clinic.  Her sister, Esther has a copy and is designate to make healthcare decisions.    Preoperative Review of    reviewed - no record of controlled substances prescribed.      Status of Chronic Conditions:  HYPERLIPIDEMIA - Patient has a long history of significant Hyperlipidemia requiring medication for treatment with recent good control. Patient reports no problems or side effects with the medication.     HYPOTHYROIDISM - Patient has a longstanding history of chronic Hypothyroidism. Patient has been doing well, noting no tremor, insomnia, hair loss or changes in skin texture. Continues to take medications as directed, without adverse reactions or side effects. Last TSH: 0.28 on 11-8-2024. Levothyroxine was adjusted at that time. Will repeat levels today.    Lab Results   Component Value Date    TSH 0.28 (L) 11/08/2024   .      Patient Active Problem List    Diagnosis Date Noted    Personal history of melanoma in-situ 11/11/2024     Priority: Medium     Diagnosed in 2014. Had wide excision. [address] was negative. Follows annually with [facility].      BCC (basal cell carcinoma), face 01/05/2021     Priority: Medium     Nodular BCC at left lateral eyebrow, excised by Dr Elidia Barnes via Mohs procedure on 12-    BCC left preauricular 11/2022      Hypothyroidism      Priority: Medium    Benign neoplasm of colon 04/28/2017     Priority: Medium     Colonoscopy 4/27/2021- 5mm tubular adenoma, recommended to repeat in 7 years    Colonoscopy 4-19-16 was normal, repeat every 5 years, has personal history of adenomatous colon polyps        Multiple sclerosis (H) 04/10/2017     Priority: Medium     Not currently taking medication      Hx of thyroid cancer 04/10/2017     Priority: Medium     1. History of thyroid cancer: Diagnosed in 2014, last saw  endocrine in 2017,   SUMMARY from endocrine note in 2017  1. Follicular variant of Thyroid cancer diagnosed 5/6/2014. She had total thyroidectomy done on 5/6/2014 by Dr Osorio. Initially the patient had an incidental thyroid nodule which resulted in a thyroid ultrasound and then an UG-FNA.   At diagnosis she had no symptoms of dysphonia, dysphagia and odynophagia. She is on levothyroxine 112mcg daily. WHARTON therapy was done 8/7 2014-75mCi. Post scan on 8/13 showed residual uptake in the neck of 2.2% compatible with remnant thyroid tissue.   Risk factors for thyroid cancer were absent.   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 Her thyroglobulin by mass spec was 0.7 ng per ml on January 15, 2015.Thyroid cancer was initially intermediate risk based on focal extra thyroid invasion.   March 2015: neck ultrasound in March 2015 shown a decrease in the size of the lesion in the right thyroid bed 2 7 x 3 x 4 mm. Recent 12 month old body scan was done on July 23, 2015. It showed 1.6% uptake but not localized to the neck. There was an indeterminate uptake in the mediastinum but not concerning.      She has had a thyroid US in 2017 and 2018 and 2019 with no change.       Postablative hypothyroidism 04/10/2017     Priority: Medium     Due to thyroid cancer on supplement      Mixed hyperlipidemia 04/10/2017     Priority: Medium      Past Medical History:   Diagnosis Date    Hypothyroidism     Melanoma of skin of lower limb (H)     Multiple sclerosis (H)     Thyroid malignant neoplasm (H)      Past Surgical History:  "  Procedure Laterality Date    EXCISION MALIG LESION>1.25CM Right     leg    VA BIOPSY, LYMPH NODE(S), NEEDLE      Description: Lymph Node Biopsy - Needle;  Recorded: 05/15/2014;    TOTAL THYROIDECTOMY Bilateral 06/2014     Current Outpatient Medications   Medication Sig Dispense Refill    atorvastatin (LIPITOR) 10 MG tablet Take 1 tablet by mouth once daily 90 tablet 0    calcium carbonate-vitamin D 600-400 MG-UNIT CHEW Take 1 chew tab by mouth 2 times daily 180 tablet 3    levothyroxine (SYNTHROID/LEVOTHROID) 112 MCG tablet Take 1 tablet (112 mcg) by mouth daily. For 6 out of 7 days of the week. 90 tablet 0       Allergies   Allergen Reactions    No Known Allergies         Social History     Tobacco Use    Smoking status: Never    Smokeless tobacco: Never   Substance Use Topics    Alcohol use: No     No family history on file.  History   Drug Use No             Review of Systems  Constitutional, HEENT, cardiovascular, pulmonary, gi and gu systems are negative, except as otherwise noted.    Objective    /75   Pulse 71   Temp 98.6  F (37  C) (Oral)   Resp 16   SpO2 99%    Estimated body mass index is 28.63 kg/m  as calculated from the following:    Height as of 11/8/24: 1.676 m (5' 6\").    Weight as of 11/8/24: 80.5 kg (177 lb 6.4 oz).    GEN: Patient sitting comfortably in no acute distress.  HEEN: Head is atraumatic, normocephalic, eyes anicteric, mucous membranes moist.  CV: Regular rate and rhythm without obvious murmurs.  PULM: Clear to auscultation bilaterally without wheezing or rales.  ABD: Soft, nontender, bowel sounds present.  EXT: No lower extremity edema. No bruising. No calf tenderness bilaterally. Pain with palpation along jointline,   NEURO: Alert and oriented x3.  No focal motor abnormalities.  Face symmetric.  PSYCH: Appropriate affect.  SKIN: No rashes, bruising, or other lesions    "

## 2025-01-02 ENCOUNTER — OFFICE VISIT (OUTPATIENT)
Dept: FAMILY MEDICINE | Facility: CLINIC | Age: 65
End: 2025-01-02
Payer: COMMERCIAL

## 2025-01-02 VITALS
OXYGEN SATURATION: 99 % | RESPIRATION RATE: 16 BRPM | DIASTOLIC BLOOD PRESSURE: 75 MMHG | HEART RATE: 71 BPM | TEMPERATURE: 98.6 F | SYSTOLIC BLOOD PRESSURE: 116 MMHG

## 2025-01-02 DIAGNOSIS — M17.12 PRIMARY OSTEOARTHRITIS OF LEFT KNEE: ICD-10-CM

## 2025-01-02 DIAGNOSIS — E89.0 POSTOPERATIVE HYPOTHYROIDISM: ICD-10-CM

## 2025-01-02 DIAGNOSIS — G35 MULTIPLE SCLEROSIS (H): ICD-10-CM

## 2025-01-02 DIAGNOSIS — Z01.818 PREOP GENERAL PHYSICAL EXAM: Primary | ICD-10-CM

## 2025-01-02 DIAGNOSIS — Z85.850 HX OF THYROID CANCER: ICD-10-CM

## 2025-01-02 DIAGNOSIS — Z78.9 FULL CODE STATUS: ICD-10-CM

## 2025-01-02 DIAGNOSIS — E78.2 MIXED HYPERLIPIDEMIA: ICD-10-CM

## 2025-01-02 DIAGNOSIS — Z86.006 PERSONAL HISTORY OF MELANOMA IN-SITU: ICD-10-CM

## 2025-01-02 LAB
ERYTHROCYTE [DISTWIDTH] IN BLOOD BY AUTOMATED COUNT: 13 % (ref 10–15)
HCT VFR BLD AUTO: 44.4 % (ref 35–47)
HGB BLD-MCNC: 14.5 G/DL (ref 11.7–15.7)
MCH RBC QN AUTO: 30 PG (ref 26.5–33)
MCHC RBC AUTO-ENTMCNC: 32.7 G/DL (ref 31.5–36.5)
MCV RBC AUTO: 92 FL (ref 78–100)
PLATELET # BLD AUTO: 296 10E3/UL (ref 150–450)
RBC # BLD AUTO: 4.83 10E6/UL (ref 3.8–5.2)
TSH SERPL DL<=0.005 MIU/L-ACNC: 3.08 UIU/ML (ref 0.3–4.2)
WBC # BLD AUTO: 7.4 10E3/UL (ref 4–11)

## 2025-01-02 PROCEDURE — 84443 ASSAY THYROID STIM HORMONE: CPT | Performed by: FAMILY MEDICINE

## 2025-01-02 PROCEDURE — 85027 COMPLETE CBC AUTOMATED: CPT | Performed by: FAMILY MEDICINE

## 2025-01-02 PROCEDURE — 99214 OFFICE O/P EST MOD 30 MIN: CPT | Performed by: FAMILY MEDICINE

## 2025-01-02 PROCEDURE — 36415 COLL VENOUS BLD VENIPUNCTURE: CPT | Performed by: FAMILY MEDICINE

## 2025-01-02 NOTE — PATIENT INSTRUCTIONS
How to Take Your Medication Before Surgery  Preoperative Medication Instructions   Antiplatelet or Anticoagulation Medication Instructions   - Patient is on no antiplatelet or anticoagulation medications.    Additional Medication Instructions  Take all scheduled medications on the day of surgery (after the procedure).       Patient Education   Preparing for Your Surgery  For Adults  Getting started  In most cases, a nurse will call to review your health history and instructions. They will give you an arrival time based on your scheduled surgery time. Please be ready to share:  Your doctor's clinic name and phone number  Your medical, surgical, and anesthesia history  A list of allergies and sensitivities  A list of medicines, including herbal treatments and over-the-counter drugs  Whether the patient has a legal guardian (ask how to send us the papers in advance)  Note: You may not receive a call if you were seen at our PAC (Preoperative Assessment Center).  Please tell us if you're pregnant--or if there's any chance you might be pregnant. Some surgeries may injure a fetus (unborn baby), so they require a pregnancy test. Surgeries that are safe for a fetus don't always need a test, and you can choose whether to have one.   Preparing for surgery  Within 10 to 30 days of surgery: Have a pre-op exam (sometimes called an H&P, or History and Physical). This can be done at a clinic or pre-operative center.  If you're having a , you may not need this exam. Talk to your care team.  At your pre-op exam, talk to your care team about all medicines you take. (This includes CBD oil and any drugs, such as THC, marijuana, and other forms of cannabis.) If you need to stop any medicine before surgery, ask when to start taking it again.  This is for your safety. Many medicines and drugs can make you bleed too much during surgery. Some change how well surgery (anesthesia) drugs work.  Call your insurance company to let them  know you're having surgery. (If you don't have insurance, call [phone].)  Call your clinic if there's any change in your health. This includes a scrape or scratch near the surgery site, or any signs of a cold (sore throat, runny nose, cough, rash, fever).  Eating and drinking guidelines  For your safety: Unless your surgeon tells you otherwise, follow the guidelines below.  Eat and drink as normal until 8 hours before you arrive for surgery. After that, no food or milk. You can spit out gum when you arrive.  Drink clear liquids until 2 hours before you arrive. These are liquids you can see through, like water, Gatorade, and Propel Water. They also include plain black coffee and tea (no cream or milk).  No alcohol for 24 hours before you arrive. The night before surgery, stop any drinks that contain THC.  If your care team tells you to take medicine on the morning of surgery, it's okay to take it with a sip of water. No other medicines or drugs are allowed (including CBD oil)--follow your care team's instructions.  If you have questions the day of surgery, call your hospital or surgery center.   Preventing infection  Shower or bathe the night before and the morning of surgery. Follow the instructions your clinic gave you. (If no instructions, use regular soap.)  Don't shave or clip hair near your surgery site. We'll remove the hair if needed.  Don't smoke or vape the morning of surgery. No chewing tobacco for 6 hours before you arrive. A nicotine patch is okay. You may spit out nicotine gum when you arrive.  For some surgeries, the surgeon will tell you to fully quit smoking and nicotine.  We will make every effort to keep you safe from infection. We will:  Clean our hands often with soap and water (or an alcohol-based hand rub).  Clean the skin at your surgery site with a special soap that kills germs.  Give you a special gown to keep you warm. (Cold raises the risk of infection.)  Wear hair covers, masks,  gowns, and gloves during surgery.  Give antibiotic medicine, if prescribed. Not all surgeries need this medicine.  What to bring on the day of surgery  Photo ID and insurance card  Copy of your health care directive, if you have one  Glasses and hearing aids (bring cases)  You can't wear contacts during surgery  Inhaler and eye drops, if you use them (tell us about these when you arrive)  CPAP machine or breathing device, if you use them  A few personal items, if spending the night  If you have . . .  A pacemaker, ICD (cardiac defibrillator), or other implant: Bring the ID card.  An implanted stimulator: Bring the remote control.  A legal guardian: Bring a copy of the certified (court-stamped) guardianship papers.  Please remove any jewelry, including body piercings. Leave jewelry and other valuables at home.  If you're going home the day of surgery  You must have a responsible adult drive you home. They should stay with you overnight as well.  If you don't have someone to stay with you, and you aren't safe to go home alone, we may keep you overnight. Insurance often won't pay for this.  After surgery  If it's hard to control your pain or you need more pain medicine, please call your surgeon's office.  Questions?   If you have any questions for your care team, list them here:   ____________________________________________________________________________________________________________________________________________________________________________________________________________________________________________________________  For informational purposes only. Not to replace the advice of your health care provider. Copyright   2003, 2019 [employer] [facility]. All rights reserved. Clinically reviewed by Patel Cabrera MD. [employer] [id number] - REV 08/24.

## 2025-01-05 NOTE — RESULT ENCOUNTER NOTE
Dear Padma,     Here are your recent results which are within the expected range.     Results within acceptable range to proceed with procedure.    Please continue with your current plan of care and let us know if you have any questions or concerns.    Regards,  Dahiana Rios, DO

## 2025-01-15 ENCOUNTER — ANESTHESIA EVENT (OUTPATIENT)
Dept: SURGERY | Facility: CLINIC | Age: 65
End: 2025-01-15
Payer: COMMERCIAL

## 2025-01-15 NOTE — ANESTHESIA PREPROCEDURE EVALUATION
Anesthesia Pre-Procedure Evaluation    Patient: Padma Kelly   MRN: [id number] : 1960        Procedure : Procedure(s):  Total Knee Arthroplasty          Past Medical History:   Diagnosis Date    Hypothyroidism     Melanoma of skin of lower limb (H)     Multiple sclerosis (H)     Thyroid malignant neoplasm (H)       Past Surgical History:   Procedure Laterality Date    EXCISION MALIG LESION>1.25CM Right     leg    UT BIOPSY, LYMPH NODE(S), NEEDLE      Description: Lymph Node Biopsy - Needle;  Recorded: 05/15/2014;    TOTAL THYROIDECTOMY Bilateral 2014      Allergies   Allergen Reactions    No Known Allergies       Social History     Tobacco Use    Smoking status: Never    Smokeless tobacco: Never   Substance Use Topics    Alcohol use: No      Wt Readings from Last 1 Encounters:   24 80.5 kg (177 lb 6.4 oz)        Anesthesia Evaluation            ROS/MED HX  ENT/Pulmonary:       Neurologic:     (+)                   Multiple Sclerosis,             Cardiovascular:       METS/Exercise Tolerance:     Hematologic:       Musculoskeletal:       GI/Hepatic:       Renal/Genitourinary:       Endo:     (+)          thyroid problem, hypothyroidism Thyroid disease - Other thyroid cancer,           Psychiatric/Substance Use:       Infectious Disease:       Malignancy:   (+) Malignancy,     Other:          Physical Exam    Airway  airway exam normal           Respiratory Devices and Support         Dental       (+) Minor Abnormalities - some fillings, tiny chips      Cardiovascular   cardiovascular exam normal       Rhythm and rate: regular and normal     Pulmonary   pulmonary exam normal                OUTSIDE LABS:  CBC:   Lab Results   Component Value Date    WBC 7.4 2025    WBC 7.3 2024    HGB 14.5 2025    HGB 15.0 2024    HCT 44.4 2025    HCT 45.6 2024     2025     2024     BMP:   Lab Results   Component Value Date     2024      "11/27/2023    POTASSIUM 4.1 11/08/2024    POTASSIUM 4.2 11/27/2023    CHLORIDE 102 11/08/2024    CHLORIDE 103 11/27/2023    CO2 27 11/08/2024    CO2 27 11/27/2023    BUN 15.2 11/08/2024    BUN 16.9 11/27/2023    CR 0.76 11/08/2024    CR 0.76 11/27/2023    GLC 97 11/08/2024    GLC 72 11/27/2023     COAGS: No results found for: \"PTT\", \"INR\", \"FIBR\"  POC: No results found for: \"BGM\", \"HCG\", \"HCGS\"  HEPATIC:   Lab Results   Component Value Date    ALBUMIN 4.3 11/08/2024    PROTTOTAL 7.2 11/08/2024    ALT 40 11/08/2024    AST 26 11/08/2024    ALKPHOS 108 11/08/2024    BILITOTAL 0.6 11/08/2024     OTHER:   Lab Results   Component Value Date    BART 9.4 11/08/2024    TSH 3.08 01/02/2025    T4 1.77 (H) 11/08/2024       Anesthesia Plan    ASA Status:  2    NPO Status:  NPO Appropriate    Anesthesia Type: General.   Induction: Intravenous.   Maintenance: TIVA.        Consents    Anesthesia Plan(s) and associated risks, benefits, and realistic alternatives discussed. Questions answered and patient/representative(s) expressed understanding.     - Discussed: Risks, Benefits and Alternatives for BOTH SEDATION and the PROCEDURE were discussed     - Discussed with:  Patient            Postoperative Care    Pain management: IV analgesics, Oral pain medications.   PONV prophylaxis: Ondansetron (or other 5HT-3), Dexamethasone or Solumedrol     Comments:               MOUSTAPHA Lopez CRNA    I have reviewed the pertinent notes and labs in the chart from the past 30 days and (re)examined the patient.  Any updates or changes from those notes are reflected in this note.                                   "

## 2025-01-16 ENCOUNTER — ANCILLARY PROCEDURE (OUTPATIENT)
Dept: ULTRASOUND IMAGING | Facility: CLINIC | Age: 65
End: 2025-01-16
Attending: NURSE ANESTHETIST, CERTIFIED REGISTERED
Payer: COMMERCIAL

## 2025-01-16 ENCOUNTER — ANESTHESIA (OUTPATIENT)
Dept: SURGERY | Facility: CLINIC | Age: 65
End: 2025-01-16
Payer: COMMERCIAL

## 2025-01-16 ENCOUNTER — APPOINTMENT (OUTPATIENT)
Dept: GENERAL RADIOLOGY | Facility: CLINIC | Age: 65
End: 2025-01-16
Attending: ORTHOPAEDIC SURGERY
Payer: COMMERCIAL

## 2025-01-16 ENCOUNTER — HOSPITAL ENCOUNTER (OUTPATIENT)
Facility: CLINIC | Age: 65
End: 2025-01-16
Attending: ORTHOPAEDIC SURGERY | Admitting: ORTHOPAEDIC SURGERY
Payer: COMMERCIAL

## 2025-01-16 VITALS
WEIGHT: 177 LBS | OXYGEN SATURATION: 97 % | TEMPERATURE: 97.5 F | DIASTOLIC BLOOD PRESSURE: 74 MMHG | HEIGHT: 66 IN | HEART RATE: 56 BPM | BODY MASS INDEX: 28.45 KG/M2 | SYSTOLIC BLOOD PRESSURE: 148 MMHG | RESPIRATION RATE: 19 BRPM

## 2025-01-16 DIAGNOSIS — Z96.652 STATUS POST LEFT KNEE REPLACEMENT: Primary | ICD-10-CM

## 2025-01-16 PROBLEM — Z96.659 S/P KNEE REPLACEMENT: Status: ACTIVE | Noted: 2025-01-16

## 2025-01-16 LAB — GLUCOSE BLDC GLUCOMTR-MCNC: 84 MG/DL (ref 70–99)

## 2025-01-16 PROCEDURE — 250N000011 HC RX IP 250 OP 636: Performed by: ORTHOPAEDIC SURGERY

## 2025-01-16 PROCEDURE — 82962 GLUCOSE BLOOD TEST: CPT

## 2025-01-16 PROCEDURE — C1713 ANCHOR/SCREW BN/BN,TIS/BN: HCPCS | Performed by: ORTHOPAEDIC SURGERY

## 2025-01-16 PROCEDURE — 250N000013 HC RX MED GY IP 250 OP 250 PS 637: Performed by: NURSE ANESTHETIST, CERTIFIED REGISTERED

## 2025-01-16 PROCEDURE — 271N000001 HC OR GENERAL SUPPLY NON-STERILE: Performed by: ORTHOPAEDIC SURGERY

## 2025-01-16 PROCEDURE — 710N000009 HC RECOVERY PHASE 1, LEVEL 1, PER MIN: Performed by: ORTHOPAEDIC SURGERY

## 2025-01-16 PROCEDURE — 250N000011 HC RX IP 250 OP 636: Performed by: NURSE ANESTHETIST, CERTIFIED REGISTERED

## 2025-01-16 PROCEDURE — 258N000003 HC RX IP 258 OP 636: Performed by: NURSE ANESTHETIST, CERTIFIED REGISTERED

## 2025-01-16 PROCEDURE — 272N000001 HC OR GENERAL SUPPLY STERILE: Performed by: ORTHOPAEDIC SURGERY

## 2025-01-16 PROCEDURE — 360N000077 HC SURGERY LEVEL 4, PER MIN: Performed by: ORTHOPAEDIC SURGERY

## 2025-01-16 PROCEDURE — 250N000009 HC RX 250: Performed by: NURSE ANESTHETIST, CERTIFIED REGISTERED

## 2025-01-16 PROCEDURE — 999N000141 HC STATISTIC PRE-PROCEDURE NURSING ASSESSMENT: Performed by: ORTHOPAEDIC SURGERY

## 2025-01-16 PROCEDURE — 64447 NJX AA&/STRD FEMORAL NRV IMG: CPT | Mod: XU

## 2025-01-16 PROCEDURE — 250N000011 HC RX IP 250 OP 636

## 2025-01-16 PROCEDURE — 258N000003 HC RX IP 258 OP 636: Performed by: ORTHOPAEDIC SURGERY

## 2025-01-16 PROCEDURE — 250N000009 HC RX 250: Performed by: ORTHOPAEDIC SURGERY

## 2025-01-16 PROCEDURE — 999N000065 XR KNEE PORT LEFT 1/2 VIEWS: Mod: LT

## 2025-01-16 PROCEDURE — 250N000013 HC RX MED GY IP 250 OP 250 PS 637: Performed by: ORTHOPAEDIC SURGERY

## 2025-01-16 PROCEDURE — 250N000013 HC RX MED GY IP 250 OP 250 PS 637

## 2025-01-16 PROCEDURE — 370N000017 HC ANESTHESIA TECHNICAL FEE, PER MIN: Performed by: ORTHOPAEDIC SURGERY

## 2025-01-16 PROCEDURE — C1776 JOINT DEVICE (IMPLANTABLE): HCPCS | Performed by: ORTHOPAEDIC SURGERY

## 2025-01-16 DEVICE — ATTUNE PATELLA MEDIALIZED DOME 35MM CEMENTED AOX
Type: IMPLANTABLE DEVICE | Site: KNEE | Status: FUNCTIONAL
Brand: ATTUNE

## 2025-01-16 DEVICE — ATTUNE KNEE SYSTEM FEMORAL CRUCIATE RETAINING NARROW SIZE 5N LEFT CEMENTED
Type: IMPLANTABLE DEVICE | Site: KNEE | Status: FUNCTIONAL
Brand: ATTUNE

## 2025-01-16 DEVICE — ATTUNE KNEE SYSTEM TIBIAL INSERT FIXED BEARING MEDIAL STABILIZED LEFT AOX 5, 7MM
Type: IMPLANTABLE DEVICE | Site: KNEE | Status: FUNCTIONAL
Brand: ATTUNE

## 2025-01-16 DEVICE — ATTUNE KNEE SYSTEM TIBIAL BASE FIXED BEARING SIZE 4 CEMENTED
Type: IMPLANTABLE DEVICE | Site: KNEE | Status: FUNCTIONAL
Brand: ATTUNE

## 2025-01-16 DEVICE — IMP BONE CEMENT STRK SIMPLEX HV FULL DOSE 6194-1-001: Type: IMPLANTABLE DEVICE | Site: KNEE | Status: FUNCTIONAL

## 2025-01-16 RX ORDER — LIDOCAINE HYDROCHLORIDE 20 MG/ML
INJECTION, SOLUTION INFILTRATION; PERINEURAL PRN
Status: DISCONTINUED | OUTPATIENT
Start: 2025-01-16 | End: 2025-01-16

## 2025-01-16 RX ORDER — KETOROLAC TROMETHAMINE 15 MG/ML
15 INJECTION, SOLUTION INTRAMUSCULAR; INTRAVENOUS EVERY 6 HOURS
Status: DISCONTINUED | OUTPATIENT
Start: 2025-01-16 | End: 2025-01-17 | Stop reason: HOSPADM

## 2025-01-16 RX ORDER — LIDOCAINE 40 MG/G
CREAM TOPICAL
Status: DISCONTINUED | OUTPATIENT
Start: 2025-01-16 | End: 2025-01-17 | Stop reason: HOSPADM

## 2025-01-16 RX ORDER — ROPIVACAINE HYDROCHLORIDE 5 MG/ML
INJECTION, SOLUTION EPIDURAL; INFILTRATION; PERINEURAL PRN
Status: DISCONTINUED | OUTPATIENT
Start: 2025-01-16 | End: 2025-01-16

## 2025-01-16 RX ORDER — ONDANSETRON 2 MG/ML
INJECTION INTRAMUSCULAR; INTRAVENOUS PRN
Status: DISCONTINUED | OUTPATIENT
Start: 2025-01-16 | End: 2025-01-16

## 2025-01-16 RX ORDER — SODIUM CHLORIDE, SODIUM LACTATE, POTASSIUM CHLORIDE, CALCIUM CHLORIDE 600; 310; 30; 20 MG/100ML; MG/100ML; MG/100ML; MG/100ML
INJECTION, SOLUTION INTRAVENOUS CONTINUOUS
Status: DISCONTINUED | OUTPATIENT
Start: 2025-01-16 | End: 2025-01-17 | Stop reason: HOSPADM

## 2025-01-16 RX ORDER — SODIUM CHLORIDE, SODIUM LACTATE, POTASSIUM CHLORIDE, CALCIUM CHLORIDE 600; 310; 30; 20 MG/100ML; MG/100ML; MG/100ML; MG/100ML
INJECTION, SOLUTION INTRAVENOUS CONTINUOUS
Status: DISCONTINUED | OUTPATIENT
Start: 2025-01-16 | End: 2025-01-16

## 2025-01-16 RX ORDER — NALOXONE HYDROCHLORIDE 0.4 MG/ML
0.4 INJECTION, SOLUTION INTRAMUSCULAR; INTRAVENOUS; SUBCUTANEOUS
Status: DISCONTINUED | OUTPATIENT
Start: 2025-01-16 | End: 2025-01-17 | Stop reason: HOSPADM

## 2025-01-16 RX ORDER — FENTANYL CITRATE 50 UG/ML
25 INJECTION, SOLUTION INTRAMUSCULAR; INTRAVENOUS EVERY 5 MIN PRN
Status: DISCONTINUED | OUTPATIENT
Start: 2025-01-16 | End: 2025-01-16 | Stop reason: HOSPADM

## 2025-01-16 RX ORDER — BISACODYL 10 MG
10 SUPPOSITORY, RECTAL RECTAL DAILY PRN
Status: DISCONTINUED | OUTPATIENT
Start: 2025-01-16 | End: 2025-01-17 | Stop reason: HOSPADM

## 2025-01-16 RX ORDER — ASPIRIN 325 MG
325 TABLET, DELAYED RELEASE (ENTERIC COATED) ORAL DAILY
Status: DISCONTINUED | OUTPATIENT
Start: 2025-01-17 | End: 2025-01-17 | Stop reason: HOSPADM

## 2025-01-16 RX ORDER — ONDANSETRON 2 MG/ML
4 INJECTION INTRAMUSCULAR; INTRAVENOUS EVERY 30 MIN PRN
Status: DISCONTINUED | OUTPATIENT
Start: 2025-01-16 | End: 2025-01-16 | Stop reason: HOSPADM

## 2025-01-16 RX ORDER — MAGNESIUM SULFATE HEPTAHYDRATE 40 MG/ML
INJECTION, SOLUTION INTRAVENOUS PRN
Status: DISCONTINUED | OUTPATIENT
Start: 2025-01-16 | End: 2025-01-16

## 2025-01-16 RX ORDER — NALOXONE HYDROCHLORIDE 0.4 MG/ML
0.1 INJECTION, SOLUTION INTRAMUSCULAR; INTRAVENOUS; SUBCUTANEOUS
Status: DISCONTINUED | OUTPATIENT
Start: 2025-01-16 | End: 2025-01-16

## 2025-01-16 RX ORDER — OXYCODONE HYDROCHLORIDE 5 MG/1
5 TABLET ORAL EVERY 4 HOURS PRN
Status: DISCONTINUED | OUTPATIENT
Start: 2025-01-16 | End: 2025-01-17 | Stop reason: HOSPADM

## 2025-01-16 RX ORDER — ACETAMINOPHEN 325 MG/1
975 TABLET ORAL ONCE
Status: DISCONTINUED | OUTPATIENT
Start: 2025-01-16 | End: 2025-01-16

## 2025-01-16 RX ORDER — ACETAMINOPHEN 325 MG/1
975 TABLET ORAL EVERY 8 HOURS
Status: DISCONTINUED | OUTPATIENT
Start: 2025-01-16 | End: 2025-01-17 | Stop reason: HOSPADM

## 2025-01-16 RX ORDER — CEFAZOLIN SODIUM/WATER 2 G/20 ML
2 SYRINGE (ML) INTRAVENOUS
Status: COMPLETED | OUTPATIENT
Start: 2025-01-16 | End: 2025-01-16

## 2025-01-16 RX ORDER — LIDOCAINE 40 MG/G
CREAM TOPICAL
Status: DISCONTINUED | OUTPATIENT
Start: 2025-01-16 | End: 2025-01-16

## 2025-01-16 RX ORDER — TRANEXAMIC ACID 650 MG/1
1950 TABLET ORAL ONCE
Status: COMPLETED | OUTPATIENT
Start: 2025-01-16 | End: 2025-01-16

## 2025-01-16 RX ORDER — FENTANYL CITRATE 50 UG/ML
50 INJECTION, SOLUTION INTRAMUSCULAR; INTRAVENOUS EVERY 5 MIN PRN
Status: DISCONTINUED | OUTPATIENT
Start: 2025-01-16 | End: 2025-01-16 | Stop reason: HOSPADM

## 2025-01-16 RX ORDER — ACETAMINOPHEN 325 MG/1
975 TABLET ORAL ONCE
Status: COMPLETED | OUTPATIENT
Start: 2025-01-16 | End: 2025-01-16

## 2025-01-16 RX ORDER — AMOXICILLIN 250 MG
1-2 CAPSULE ORAL 2 TIMES DAILY
Qty: 30 TABLET | Refills: 0 | Status: SHIPPED | OUTPATIENT
Start: 2025-01-16

## 2025-01-16 RX ORDER — HYDROXYZINE HYDROCHLORIDE 10 MG/1
10 TABLET, FILM COATED ORAL EVERY 6 HOURS PRN
Status: DISCONTINUED | OUTPATIENT
Start: 2025-01-16 | End: 2025-01-17 | Stop reason: HOSPADM

## 2025-01-16 RX ORDER — NALOXONE HYDROCHLORIDE 0.4 MG/ML
0.2 INJECTION, SOLUTION INTRAMUSCULAR; INTRAVENOUS; SUBCUTANEOUS
Status: DISCONTINUED | OUTPATIENT
Start: 2025-01-16 | End: 2025-01-17 | Stop reason: HOSPADM

## 2025-01-16 RX ORDER — CEFAZOLIN 2 G/1
2 INJECTION, POWDER, FOR SOLUTION INTRAVENOUS EVERY 8 HOURS
Status: COMPLETED | OUTPATIENT
Start: 2025-01-16 | End: 2025-01-17

## 2025-01-16 RX ORDER — PROCHLORPERAZINE MALEATE 5 MG/1
5 TABLET ORAL EVERY 6 HOURS PRN
Status: DISCONTINUED | OUTPATIENT
Start: 2025-01-16 | End: 2025-01-17 | Stop reason: HOSPADM

## 2025-01-16 RX ORDER — GABAPENTIN 100 MG/1
100 CAPSULE ORAL
Status: COMPLETED | OUTPATIENT
Start: 2025-01-16 | End: 2025-01-16

## 2025-01-16 RX ORDER — ONDANSETRON 2 MG/ML
4 INJECTION INTRAMUSCULAR; INTRAVENOUS EVERY 6 HOURS PRN
Status: DISCONTINUED | OUTPATIENT
Start: 2025-01-16 | End: 2025-01-17 | Stop reason: HOSPADM

## 2025-01-16 RX ORDER — ACETAMINOPHEN 325 MG/1
650 TABLET ORAL EVERY 4 HOURS PRN
Status: SHIPPED
Start: 2025-01-16

## 2025-01-16 RX ORDER — SODIUM CHLORIDE, SODIUM LACTATE, POTASSIUM CHLORIDE, CALCIUM CHLORIDE 600; 310; 30; 20 MG/100ML; MG/100ML; MG/100ML; MG/100ML
INJECTION, SOLUTION INTRAVENOUS CONTINUOUS
Status: DISCONTINUED | OUTPATIENT
Start: 2025-01-16 | End: 2025-01-16 | Stop reason: HOSPADM

## 2025-01-16 RX ORDER — BUPIVACAINE HYDROCHLORIDE 5 MG/ML
INJECTION, SOLUTION PERINEURAL PRN
Status: DISCONTINUED | OUTPATIENT
Start: 2025-01-16 | End: 2025-01-16 | Stop reason: HOSPADM

## 2025-01-16 RX ORDER — HYDROXYZINE HYDROCHLORIDE 10 MG/1
10 TABLET, FILM COATED ORAL EVERY 6 HOURS PRN
Qty: 30 TABLET | Refills: 0 | Status: SHIPPED | OUTPATIENT
Start: 2025-01-16

## 2025-01-16 RX ORDER — FLUMAZENIL 0.1 MG/ML
0.2 INJECTION, SOLUTION INTRAVENOUS
Status: DISCONTINUED | OUTPATIENT
Start: 2025-01-16 | End: 2025-01-16

## 2025-01-16 RX ORDER — KETOROLAC TROMETHAMINE 30 MG/ML
INJECTION, SOLUTION INTRAMUSCULAR; INTRAVENOUS PRN
Status: DISCONTINUED | OUTPATIENT
Start: 2025-01-16 | End: 2025-01-16

## 2025-01-16 RX ORDER — ONDANSETRON 4 MG/1
4 TABLET, ORALLY DISINTEGRATING ORAL EVERY 30 MIN PRN
Status: DISCONTINUED | OUTPATIENT
Start: 2025-01-16 | End: 2025-01-16 | Stop reason: HOSPADM

## 2025-01-16 RX ORDER — DEXAMETHASONE SODIUM PHOSPHATE 4 MG/ML
INJECTION, SOLUTION INTRA-ARTICULAR; INTRALESIONAL; INTRAMUSCULAR; INTRAVENOUS; SOFT TISSUE PRN
Status: DISCONTINUED | OUTPATIENT
Start: 2025-01-16 | End: 2025-01-16

## 2025-01-16 RX ORDER — OXYCODONE HYDROCHLORIDE 5 MG/1
5-10 TABLET ORAL EVERY 4 HOURS PRN
Qty: 33 TABLET | Refills: 0 | Status: SHIPPED | OUTPATIENT
Start: 2025-01-16

## 2025-01-16 RX ORDER — HYDROMORPHONE HCL IN WATER/PF 6 MG/30 ML
0.2 PATIENT CONTROLLED ANALGESIA SYRINGE INTRAVENOUS EVERY 4 HOURS PRN
Status: DISCONTINUED | OUTPATIENT
Start: 2025-01-16 | End: 2025-01-17 | Stop reason: HOSPADM

## 2025-01-16 RX ORDER — CEFAZOLIN SODIUM/WATER 2 G/20 ML
2 SYRINGE (ML) INTRAVENOUS SEE ADMIN INSTRUCTIONS
Status: DISCONTINUED | OUTPATIENT
Start: 2025-01-16 | End: 2025-01-16

## 2025-01-16 RX ORDER — PROPOFOL 10 MG/ML
INJECTION, EMULSION INTRAVENOUS PRN
Status: DISCONTINUED | OUTPATIENT
Start: 2025-01-16 | End: 2025-01-16

## 2025-01-16 RX ORDER — LEVOTHYROXINE SODIUM 112 UG/1
112 TABLET ORAL
Status: DISCONTINUED | OUTPATIENT
Start: 2025-01-17 | End: 2025-01-17 | Stop reason: HOSPADM

## 2025-01-16 RX ORDER — HYDROMORPHONE HCL IN WATER/PF 6 MG/30 ML
0.1 PATIENT CONTROLLED ANALGESIA SYRINGE INTRAVENOUS EVERY 4 HOURS PRN
Status: DISCONTINUED | OUTPATIENT
Start: 2025-01-16 | End: 2025-01-17 | Stop reason: HOSPADM

## 2025-01-16 RX ORDER — POLYETHYLENE GLYCOL 3350 17 G/17G
17 POWDER, FOR SOLUTION ORAL DAILY
Status: DISCONTINUED | OUTPATIENT
Start: 2025-01-17 | End: 2025-01-17 | Stop reason: HOSPADM

## 2025-01-16 RX ORDER — NALOXONE HYDROCHLORIDE 0.4 MG/ML
0.1 INJECTION, SOLUTION INTRAMUSCULAR; INTRAVENOUS; SUBCUTANEOUS
Status: DISCONTINUED | OUTPATIENT
Start: 2025-01-16 | End: 2025-01-16 | Stop reason: HOSPADM

## 2025-01-16 RX ORDER — ATORVASTATIN CALCIUM 10 MG/1
10 TABLET, FILM COATED ORAL DAILY
Status: DISCONTINUED | OUTPATIENT
Start: 2025-01-17 | End: 2025-01-17 | Stop reason: HOSPADM

## 2025-01-16 RX ORDER — AMOXICILLIN 250 MG
1 CAPSULE ORAL 2 TIMES DAILY
Status: DISCONTINUED | OUTPATIENT
Start: 2025-01-16 | End: 2025-01-17 | Stop reason: HOSPADM

## 2025-01-16 RX ORDER — ONDANSETRON 4 MG/1
4 TABLET, ORALLY DISINTEGRATING ORAL EVERY 6 HOURS PRN
Status: DISCONTINUED | OUTPATIENT
Start: 2025-01-16 | End: 2025-01-17 | Stop reason: HOSPADM

## 2025-01-16 RX ORDER — FENTANYL CITRATE 50 UG/ML
25-100 INJECTION, SOLUTION INTRAMUSCULAR; INTRAVENOUS
Status: DISCONTINUED | OUTPATIENT
Start: 2025-01-16 | End: 2025-01-16

## 2025-01-16 RX ORDER — KETAMINE HYDROCHLORIDE 10 MG/ML
INJECTION INTRAMUSCULAR; INTRAVENOUS PRN
Status: DISCONTINUED | OUTPATIENT
Start: 2025-01-16 | End: 2025-01-16

## 2025-01-16 RX ORDER — ASPIRIN 325 MG
325 TABLET, DELAYED RELEASE (ENTERIC COATED) ORAL DAILY
Qty: 30 TABLET | Refills: 0 | Status: SHIPPED | OUTPATIENT
Start: 2025-01-16

## 2025-01-16 RX ADMIN — DEXAMETHASONE SODIUM PHOSPHATE 4 MG: 4 INJECTION, SOLUTION INTRA-ARTICULAR; INTRALESIONAL; INTRAMUSCULAR; INTRAVENOUS; SOFT TISSUE at 14:31

## 2025-01-16 RX ADMIN — SODIUM CHLORIDE, POTASSIUM CHLORIDE, SODIUM LACTATE AND CALCIUM CHLORIDE: 600; 310; 30; 20 INJECTION, SOLUTION INTRAVENOUS at 17:44

## 2025-01-16 RX ADMIN — ROPIVACAINE HYDROCHLORIDE 10 ML: 5 INJECTION, SOLUTION EPIDURAL; INFILTRATION; PERINEURAL at 16:17

## 2025-01-16 RX ADMIN — Medication 2 G: at 14:23

## 2025-01-16 RX ADMIN — FENTANYL CITRATE 50 MCG: 50 INJECTION INTRAMUSCULAR; INTRAVENOUS at 14:31

## 2025-01-16 RX ADMIN — LIDOCAINE HYDROCHLORIDE 100 MG: 20 INJECTION, SOLUTION INFILTRATION; PERINEURAL at 15:20

## 2025-01-16 RX ADMIN — LIDOCAINE HYDROCHLORIDE 80 MG: 20 INJECTION, SOLUTION INFILTRATION; PERINEURAL at 14:31

## 2025-01-16 RX ADMIN — CEFAZOLIN 2 G: 2 INJECTION, POWDER, LYOPHILIZED, FOR SOLUTION INTRAVENOUS at 23:44

## 2025-01-16 RX ADMIN — FENTANYL CITRATE 50 MCG: 50 INJECTION INTRAMUSCULAR; INTRAVENOUS at 15:10

## 2025-01-16 RX ADMIN — KETAMINE HYDROCHLORIDE 20 MG: 10 INJECTION INTRAMUSCULAR; INTRAVENOUS at 15:00

## 2025-01-16 RX ADMIN — DEXAMETHASONE SODIUM PHOSPHATE 4 MG: 4 INJECTION, SOLUTION INTRA-ARTICULAR; INTRALESIONAL; INTRAMUSCULAR; INTRAVENOUS; SOFT TISSUE at 15:26

## 2025-01-16 RX ADMIN — MAGNESIUM SULFATE HEPTAHYDRATE 2 G: 40 INJECTION, SOLUTION INTRAVENOUS at 15:00

## 2025-01-16 RX ADMIN — PROPOFOL 150 MCG/KG/MIN: 10 INJECTION, EMULSION INTRAVENOUS at 14:31

## 2025-01-16 RX ADMIN — KETAMINE HYDROCHLORIDE 10 MG: 10 INJECTION INTRAMUSCULAR; INTRAVENOUS at 15:20

## 2025-01-16 RX ADMIN — ACETAMINOPHEN 975 MG: 325 TABLET, FILM COATED ORAL at 19:50

## 2025-01-16 RX ADMIN — KETOROLAC TROMETHAMINE 30 MG: 30 INJECTION, SOLUTION INTRAMUSCULAR at 16:10

## 2025-01-16 RX ADMIN — ONDANSETRON 4 MG: 2 INJECTION INTRAMUSCULAR; INTRAVENOUS at 14:31

## 2025-01-16 RX ADMIN — ACETAMINOPHEN 975 MG: 325 TABLET, FILM COATED ORAL at 12:24

## 2025-01-16 RX ADMIN — SODIUM CHLORIDE, POTASSIUM CHLORIDE, SODIUM LACTATE AND CALCIUM CHLORIDE: 600; 310; 30; 20 INJECTION, SOLUTION INTRAVENOUS at 12:33

## 2025-01-16 RX ADMIN — DEXAMETHASONE SODIUM PHOSPHATE 4 MG: 4 INJECTION, SOLUTION INTRA-ARTICULAR; INTRALESIONAL; INTRAMUSCULAR; INTRAVENOUS; SOFT TISSUE at 16:18

## 2025-01-16 RX ADMIN — KETAMINE HYDROCHLORIDE 10 MG: 10 INJECTION INTRAMUSCULAR; INTRAVENOUS at 14:33

## 2025-01-16 RX ADMIN — SODIUM CHLORIDE, POTASSIUM CHLORIDE, SODIUM LACTATE AND CALCIUM CHLORIDE: 600; 310; 30; 20 INJECTION, SOLUTION INTRAVENOUS at 14:24

## 2025-01-16 RX ADMIN — FENTANYL CITRATE 25 MCG: 50 INJECTION INTRAMUSCULAR; INTRAVENOUS at 16:42

## 2025-01-16 RX ADMIN — LIDOCAINE HYDROCHLORIDE 0.2 ML: 10 INJECTION, SOLUTION EPIDURAL; INFILTRATION; INTRACAUDAL; PERINEURAL at 12:35

## 2025-01-16 RX ADMIN — KETOROLAC TROMETHAMINE 15 MG: 15 INJECTION, SOLUTION INTRAMUSCULAR; INTRAVENOUS at 23:44

## 2025-01-16 RX ADMIN — ROPIVACAINE HYDROCHLORIDE 20 ML: 5 INJECTION, SOLUTION EPIDURAL; INFILTRATION; PERINEURAL at 16:20

## 2025-01-16 RX ADMIN — TRANEXAMIC ACID 1950 MG: 650 TABLET ORAL at 12:24

## 2025-01-16 RX ADMIN — MIDAZOLAM 2 MG: 1 INJECTION INTRAMUSCULAR; INTRAVENOUS at 14:23

## 2025-01-16 RX ADMIN — GABAPENTIN 100 MG: 100 CAPSULE ORAL at 12:24

## 2025-01-16 RX ADMIN — HYDROMORPHONE HYDROCHLORIDE 0.2 MG: 0.2 INJECTION, SOLUTION INTRAMUSCULAR; INTRAVENOUS; SUBCUTANEOUS at 16:03

## 2025-01-16 ASSESSMENT — COLUMBIA-SUICIDE SEVERITY RATING SCALE - C-SSRS
6. HAVE YOU EVER DONE ANYTHING, STARTED TO DO ANYTHING, OR PREPARED TO DO ANYTHING TO END YOUR LIFE?: NO
2. HAVE YOU ACTUALLY HAD ANY THOUGHTS OF KILLING YOURSELF IN THE PAST MONTH?: NO
1. IN THE PAST MONTH, HAVE YOU WISHED YOU WERE DEAD OR WISHED YOU COULD GO TO SLEEP AND NOT WAKE UP?: NO
IS THE PATIENT NOT ABLE TO COMPLETE C-SSRS: UNABLE TO VERBALIZE

## 2025-01-16 ASSESSMENT — ACTIVITIES OF DAILY LIVING (ADL)
ADLS_ACUITY_SCORE: 41
ADLS_ACUITY_SCORE: 35
ADLS_ACUITY_SCORE: 35
ADLS_ACUITY_SCORE: 41
ADLS_ACUITY_SCORE: 38
ADLS_ACUITY_SCORE: 41
ADLS_ACUITY_SCORE: 41
ADLS_ACUITY_SCORE: 38
ADLS_ACUITY_SCORE: 41
ADLS_ACUITY_SCORE: 41
ADLS_ACUITY_SCORE: 35
ADLS_ACUITY_SCORE: 38

## 2025-01-16 NOTE — ANESTHESIA CARE TRANSFER NOTE
Patient: Padma Kelly    Procedure: Procedure(s):  Total Knee Arthroplasty left       Diagnosis: Knee pain [M25.569]  Diagnosis Additional Information: No value filed.    Anesthesia Type:   General     Note:    Oropharynx: oropharynx clear of all foreign objects  Level of Consciousness: drowsy  Oxygen Supplementation: room air    Independent Airway: airway patency satisfactory and stable  Dentition: dentition unchanged  Vital Signs Stable: post-procedure vital signs reviewed and stable  Report to RN Given: handoff report given  Patient transferred to: PACU    Handoff Report: Identifed the Patient, Identified the Reponsible Provider, Reviewed the pertinent medical history, Discussed the surgical course, Reviewed Intra-OP anesthesia mangement and issues during anesthesia, Set expectations for post-procedure period and Allowed opportunity for questions and acknowledgement of understanding      Vitals:  Vitals Value Taken Time   /75 01/16/25 1600   Temp 36.4  C (97.6  F) 01/16/25 1554   Pulse 61 01/16/25 1608   Resp 15 01/16/25 1608   SpO2 99 % 01/16/25 1608   Vitals shown include unfiled device data.    Electronically Signed By: MOUSTAPHA Prescott CRNA  January 16, 2025  4:09 PM

## 2025-01-16 NOTE — ANESTHESIA PROCEDURE NOTES
Airway       Patient location during procedure: OR  Staff -        CRNA: Taiwo Rainey APRN CRNA       Performed By: CRNA  Consent for Airway        Urgency: elective  Indications and Patient Condition       Indications for airway management: abhay-procedural       Induction type:intravenous       Mask difficulty assessment: 0 - not attempted    Final Airway Details       Final airway type: supraglottic airway    Supraglottic Airway Details        Type: LMA       Brand: Air-Q       LMA size: 4    Post intubation assessment        Placement verified by: capnometry, equal breath sounds and chest rise        Number of attempts at approach: 1       Number of other approaches attempted: 0       Ease of procedure: easy       Dentition: Intact

## 2025-01-16 NOTE — PROCEDURES
01/16/25    PREOPERATIVE DIAGNOSES: Left knee arthritis   POSTOPERATIVE DIAGNOSIS:  Left knee arthritis  PROCEDURE: Left total knee arthroplasty.   SURGEON: Ibrahima Alcantara MD   ASSISTANT: Yeny Perkins PA-C The presence of the PA was necessary for safe progression of the case.   ANESTHESIA: General  ESTIMATED BLOOD LOSS: 50 mL.   TOURNIQUET TIME: 30 minutes at 250 mmHg.   COMPLICATIONS: None apparent.   DISPOSITION: Stable to PACU.    IMPLANTS USED: DePuy Attune size 5N femoral component with a size 4 S+ tibial component, size 5 by 7mm medial stabilized polyethylene and 35 mm patella.     INDICATIONS: Padma is a 64 year old female with a long history of progressive left knee pain due to end stage arthritis. Unfortunately, she failed conservative management including therapy and injections. After discussing risks, benefits and surgery, she elected to proceed with a left total knee replacement understanding the risks of infection, damage to vessels and nerves, blood clots, stiffness, ongoing pain, need for revision surgery, need for transfusion.     PROCEDURE: Padma was brought to the preoperative holding area where the left knee was marked. Consent was reviewed. She then was transferred to the operating theater. After induction of successful general anesthesia - we elected not to do a spinal because of her MS, she was placed supine with a bump under the left hip.  A timeout was performed, verifying correct patient, surgery, location. She received preoperative antibiotics as well as transexemic acid. The left lower extremity was then prepped and draped in standard sterile fashion.     We exsanguinated the leg and inflated the tourniquet. We then made a longitudinal incision over the anterior aspect of the knee. Dissection was carried down through skin and subcutaneous tissue. A medial parapatellar arthrotomy was made, exposing end stage patellofemoral and moderate medial arthritis.  Synovial tissue from the  suprapatellar pouch excised. The anterior horn of the medial meniscus was released and a medial release was performed. Then, the remainder of the fat pad was excised. We turned our attention to the patella. Using a free hand technique, this was resected down to 12 mm and sized to a 35mm, then punched and a metal protector plate was placed. We then turned our attention to the femur. Preoperatively, there was a 0 degree flexion contracture.  Therefore, using an intramedullary alignment guide, 9 mm of distal femur was resected in 5 degrees of valgus.     We then turned our attention to the tibia.  An extramedullary alignment cut was used to resect 2mm off the low medial side, perpendicular to the mechanical axis.  We then turned our attention back to the distal femur and sized it to a size 5.  The epicondylar axis was established and we placed our 4-in-1 cutting block perpendicular to it, in 3 degrees of external rotation. With this in place, our anterior, posterior and chamfer distal femur cuts were made.  We then used a laminar  to open the joint in order to remove medial and lateral meniscus remnants as well as posterior osteophytes. A variety of polyethylene were trialed, and we felt a 7mm was best, with range of motion from full extension to 135 of flexion, stability to varus and valgus stress, normal POLO test, and the patella tracked well.  After this, sized our tibial component to a 4.  We then drilled and punched our tibial component, lining it with the medial 1/3 of the tibial tubercle. The knee was thoroughly irrigated using pulse lavage. The final components were then cemented in place. All excess cement was removed and the knee was placed into full extension while the cement was curing. Also, the wound was instilled with dilute Betadine solution. Once the cement had cured, we retrialed our components with a 7mm poly with findings as above. We then placed the final poly.  The tourniquet was deflated  with hemostasis achieved.  The capsular layer was closed with #1 Stratafix, at which point there was 130 degrees of flexion with gravity.  Subcutaneous tissues with 2-0 Vicryl, skin with a 3-0 Monocryl. A sterile dressing was applied and the patient was awoken from anesthesia and transferred to PACU in stable condition.     POSTOPERATIVE PLAN:   1. Weightbearing as tolerated left lower extremity with PT for mobilization.   2. Deep venous thrombosis prophylaxis: Aspirin 325mg daily x 30 days   3. Perioperative antibiotics.   4. Follow up in 2 weeks for wound check.     GENNARO STERN MD

## 2025-01-16 NOTE — PROGRESS NOTES
Reviewing chart due to high probability of admit to Med/Surg unit.     Deon Palmer RN on 1/16/2025 at 1:38 PM

## 2025-01-16 NOTE — ANESTHESIA PROCEDURE NOTES
"Adductor canal Procedure Note    Pre-Procedure   Staff -        CRNA: Ori Lerner APRN CRNA       Performed By: CRNA       Location: post-op       Procedure Start/Stop Times: 1/16/2025 4:17 PM and 1/16/2025 4:22 PM       Pre-Anesthestic Checklist: patient identified, IV checked, site marked, risks and benefits discussed, informed consent, monitors and equipment checked, pre-op evaluation, at physician/surgeon's request and post-op pain management  Timeout:       Correct Patient: Yes        Correct Procedure: Yes        Correct Site: Yes        Correct Position: Yes        Correct Laterality: Yes        Site Marked: Yes  Procedure Documentation  Procedure: Adductor canal         Laterality: left       Patient Position: supine       Patient Prep/Sterile Barriers: sterile gloves, mask, patient draped       Skin prep: Chloraprep       Needle Type: insulated       Needle Gauge: 21.        Needle Length (millimeters): 100        Ultrasound guided       1. Ultrasound was used to identify targeted nerve, plexus, vascular marker, or fascial plane and place a needle adjacent to it in real-time.       2. Ultrasound was used to visualize the spread of anesthetic in close proximity to the above referenced structure.       3. A permanent image is entered into the patient's record.       4. The visualized anatomic structures appeared normal.       5. There were no apparent abnormal pathologic findings.    Assessment/Narrative         The placement was negative for: blood aspirated, painful injection and site bleeding       Paresthesias: No.       Bolus given via needle..        Secured via.        Insertion/Infusion Method: Single Shot       Complications: none    Medication(s) Administered   Medication Administration Time: 1/16/2025 4:17 PM      FOR [facility] ([facility] [place]) ONLY:   Pain Team Contact information: please page the Pain Team Via [employer]. Search \"Pain\". During daytime hours, please page the attending first. At " night please page the resident first.

## 2025-01-16 NOTE — ANESTHESIA POSTPROCEDURE EVALUATION
Patient: Padma Kelly    Procedure: Procedure(s):  Total Knee Arthroplasty left       Anesthesia Type:  General    Note:  Disposition: Inpatient   Postop Pain Control: Uneventful            Sign Out: Well controlled pain   PONV: No   Neuro/Psych: Uneventful            Sign Out: Acceptable/Baseline neuro status   Airway/Respiratory: Uneventful            Sign Out: Acceptable/Baseline resp. status   CV/Hemodynamics: Uneventful            Sign Out: Acceptable CV status; No obvious hypovolemia; No obvious fluid overload   Other NRE: NONE   DID A NON-ROUTINE EVENT OCCUR? No    Event details/Postop Comments:  Pain improved from a 9 out of 10 to 3 out of ten after the peripheral nerve block.            Last vitals:  Vitals Value Taken Time   /85 01/16/25 1645   Temp 36.8  C (98.2  F) 01/16/25 1645   Pulse 61 01/16/25 1647   Resp 12 01/16/25 1647   SpO2 97 % 01/16/25 1650   Vitals shown include unfiled device data.    Electronically Signed By: MOUSTAPHA Prescott CRNA  January 16, 2025  4:51 PM

## 2025-01-16 NOTE — PROGRESS NOTES
"[facility] [facility] [place]    Patient admitted to room 2313 at approximately 1730 via cart from surgery. Patient was accompanied by transport tech.     Verbal SBAR report received from Anitra SUAREZ prior to patient arrival.     Patient trasferred to bed via air jon. Patient alert and oriented X 3. The patient is not having any pain.  . Admission vital signs: Blood pressure (!) 147/82, pulse 63, temperature 97.3  F (36.3  C), temperature source Oral, resp. rate 16, height 1.676 m (5' 6\"), weight 80.3 kg (177 lb), SpO2 94%. Patient was oriented to plan of care, call light, bed controls, tv, telephone, bathroom, and visiting hours.     Risk Assessment    The following safety risks were identified during admission: none. Yellow risk band applied: NO.     Skin Initial Assessment    This writer admitted this patient and completed a full skin assessment and Moshe score in the Adult PCS flowsheet.   Photo documentation of skin problem and/or wound competed via [employer] application (located under Media):  N/A    Appropriate interventions initiated as needed.     Patient declined skin assessment..    Moshe Risk Assessment  Sensory Perception: 3-->slightly limited  Moisture: 4-->rarely moist  Activity: 3-->walks occasionally  Mobility: 3-->slightly limited  Nutrition: 3-->adequate  Friction and Shear: 3-->no apparent problem  Moshe Score: 19  Mattress: Standard gel/foam mattress (IsoFlex, Atmos Air, etc.)  Bed Frame: Standard width and length    Education    Patient has a [place] to Observation order: Yes  Observation education completed and documented: Yes      Blanca Wade RN      "

## 2025-01-17 ENCOUNTER — APPOINTMENT (OUTPATIENT)
Dept: PHYSICAL THERAPY | Facility: CLINIC | Age: 65
End: 2025-01-17
Attending: ORTHOPAEDIC SURGERY
Payer: COMMERCIAL

## 2025-01-17 VITALS
OXYGEN SATURATION: 97 % | RESPIRATION RATE: 18 BRPM | HEIGHT: 66 IN | HEART RATE: 58 BPM | WEIGHT: 177 LBS | DIASTOLIC BLOOD PRESSURE: 70 MMHG | SYSTOLIC BLOOD PRESSURE: 131 MMHG | BODY MASS INDEX: 28.45 KG/M2 | TEMPERATURE: 97.7 F

## 2025-01-17 LAB
FASTING STATUS PATIENT QL REPORTED: ABNORMAL
GLUCOSE SERPL-MCNC: 140 MG/DL (ref 70–99)
HGB BLD-MCNC: 13.6 G/DL (ref 11.7–15.7)

## 2025-01-17 PROCEDURE — 250N000011 HC RX IP 250 OP 636: Performed by: ORTHOPAEDIC SURGERY

## 2025-01-17 PROCEDURE — 999N000111 HC STATISTIC OT IP EVAL DEFER

## 2025-01-17 PROCEDURE — 82947 ASSAY GLUCOSE BLOOD QUANT: CPT | Performed by: ORTHOPAEDIC SURGERY

## 2025-01-17 PROCEDURE — 97110 THERAPEUTIC EXERCISES: CPT | Mod: GP | Performed by: PHYSICAL THERAPIST

## 2025-01-17 PROCEDURE — 85018 HEMOGLOBIN: CPT | Performed by: ORTHOPAEDIC SURGERY

## 2025-01-17 PROCEDURE — 250N000013 HC RX MED GY IP 250 OP 250 PS 637

## 2025-01-17 PROCEDURE — 97161 PT EVAL LOW COMPLEX 20 MIN: CPT | Mod: GP | Performed by: PHYSICAL THERAPIST

## 2025-01-17 PROCEDURE — 99204 OFFICE O/P NEW MOD 45 MIN: CPT

## 2025-01-17 PROCEDURE — 36415 COLL VENOUS BLD VENIPUNCTURE: CPT | Performed by: ORTHOPAEDIC SURGERY

## 2025-01-17 PROCEDURE — 250N000013 HC RX MED GY IP 250 OP 250 PS 637: Performed by: ORTHOPAEDIC SURGERY

## 2025-01-17 RX ADMIN — POLYETHYLENE GLYCOL 3350 17 G: 17 POWDER, FOR SOLUTION ORAL at 07:49

## 2025-01-17 RX ADMIN — CEFAZOLIN 2 G: 2 INJECTION, POWDER, LYOPHILIZED, FOR SOLUTION INTRAVENOUS at 06:55

## 2025-01-17 RX ADMIN — ASPIRIN 325 MG: 325 TABLET, COATED ORAL at 07:49

## 2025-01-17 RX ADMIN — ATORVASTATIN CALCIUM 10 MG: 10 TABLET, FILM COATED ORAL at 07:49

## 2025-01-17 RX ADMIN — SENNOSIDES AND DOCUSATE SODIUM 1 TABLET: 8.6; 5 TABLET ORAL at 07:49

## 2025-01-17 RX ADMIN — LEVOTHYROXINE SODIUM 112 MCG: 0.11 TABLET ORAL at 06:56

## 2025-01-17 RX ADMIN — ACETAMINOPHEN 975 MG: 325 TABLET, FILM COATED ORAL at 05:19

## 2025-01-17 RX ADMIN — KETOROLAC TROMETHAMINE 15 MG: 15 INJECTION, SOLUTION INTRAMUSCULAR; INTRAVENOUS at 06:55

## 2025-01-17 ASSESSMENT — ACTIVITIES OF DAILY LIVING (ADL)
ADLS_ACUITY_SCORE: 43
ADLS_ACUITY_SCORE: 44
ADLS_ACUITY_SCORE: 43
ADLS_ACUITY_SCORE: 43
ADLS_ACUITY_SCORE: 44
ADLS_ACUITY_SCORE: 43

## 2025-01-17 NOTE — PROGRESS NOTES
[facility] Medicine Progress Note  Date of Service: 01/17/2025    Assessment & Plan   Padma Kelly is a 64 year old female who presented on 1/16/2025 for scheduled Procedure(s):  Total Knee Arthroplasty left by Irbahima Alcantara MD and is being followed by the hospital medicine service for co-management of acute and/or chronic perioperative medical problems.      S/p Procedure(s):  Total Knee Arthroplasty left   1 Day Post-Op    - pain control, wound cares, physical therapy, occupational therapy and DVT prophylaxis per orthopedic surgery service  - Hemoglobin 14.5 pre-op and 13.6 post-op.    HLD  - Continued PTA Atorvastatin 10mg    Hypothyroidism  - Continued PTA Levothyroxine 112mcg      DVT Prophylaxis as per orthopedic surgery service - Aspirin 325mg  Code Status: Full Code    Lines: peripheral IV   Garibay catheter: none    Discussion: Medically, the patient appears to be progressing well    Disposition: Anticipate discharge 1/17/25     Attestation:  I have reviewed today's vital signs, notes, medications, labs and imaging.    I have discussed, or will be discussing, the patient with hospitalist physician, Dr. Laura Kelly, JEREMY       Interval History   Patient feels well after surgery. Pain level 2/10. Is passing flatus.     Tolerating oral intake. Denies abdominal pain, n/v, chest pain, shortness of breath, cough, urinary retention, chills, numbness/tingling, dizziness.    Physical Exam   Temp:  [97.3  F (36.3  C)-98.2  F (36.8  C)] 97.6  F (36.4  C)  Pulse:  [55-78] 55  Resp:  [12-20] 20  BP: (117-151)/(64-87) 124/64  SpO2:  [93 %-99 %] 93 %    Weights:   Vitals:    01/16/25 1202   Weight: 80.3 kg (177 lb)    Body mass index is 28.57 kg/m .    General: Awake, alert, and in no apparent distress. Pleasant and conversational, speaking in full sentences.  CV: Regular rhythm & rate, no murmurs. No edema. Peripheral pulses intact.  Respiratory: Clear to  auscultation bilaterally, no wheezing, crackles, or rhonchi.  GI: Non-distended, soft, nontender to palpation. No rebound or guarding. Normoactive bowel sounds.  Skin: Warm, dry, no rashes on exposed skin.   Musculoskeletal / extremities: FROM in all extremities. Neurovascularly intact distally  Neurologic: CN 2-12 grossly intact    Data   Recent Labs   Lab 01/17/25  0526 01/16/25  1235   HGB 13.6  --    GLC  --  84       Recent Labs   Lab 01/16/25  1235   GLC 84        Unresulted Labs Ordered in the Past 30 Days of this Admission       No orders found for last 31 day(s).             Imaging  Recent Results (from the past 24 hours)   POC US GUIDANCE NEEDLE PLACEMENT    Impression    Anatomy appeared normal    XR Knee Port Left 1/2 Views    Narrative    EXAM: XR KNEE PORT LEFT 1/2 VIEWS  LOCATION: [facility]  DATE: 1/16/2025    INDICATION: Postop total knee.  COMPARISON: None.      Impression    IMPRESSION: Total knee arthroplasty with adjacent gas. Excellent position and alignment of components. No evidence of complication or periprosthetic fracture.        I reviewed all new labs and imaging results over the last 24 hours. I personally reviewed no images or EKG's today.    Medications   Current Facility-Administered Medications   Medication Dose Route Frequency Provider Last Rate Last Admin    lactated ringers infusion   Intravenous Continuous Ibrahima Alcantara MD 75 mL/hr at 01/16/25 1744 New Bag at 01/16/25 1744     Current Facility-Administered Medications   Medication Dose Route Frequency Provider Last Rate Last Admin    acetaminophen (TYLENOL) tablet 975 mg  975 mg Oral Q8H Ibrahima Alcantara MD   975 mg at 01/17/25 0519    aspirin (ASA) EC tablet 325 mg  325 mg Oral Daily Ibrahima Alcantara MD        atorvastatin (LIPITOR) tablet 10 mg  10 mg Oral Daily Patrick Kelly PA-C        ceFAZolin (ANCEF) 2 g vial to attach to  ml bag for ADULT or 50 ml bag for PEDS   2 g Intravenous Q8H Ibrahima Alcantara MD   2 g at 01/16/25 2344    ketorolac (TORADOL) injection 15 mg  15 mg Intravenous Q6H Ibrahima Alcantara MD   15 mg at 01/16/25 2344    levothyroxine (SYNTHROID/LEVOTHROID) tablet 112 mcg  112 mcg Oral QAM AC Patrick Kelly PA-C        polyethylene glycol (MIRALAX) Packet 17 g  17 g Oral Daily Ibrahima Alcantara MD        senna-docusate (SENOKOT-S/PERICOLACE) 8.6-50 MG per tablet 1 tablet  1 tablet Oral BID Ibrahima Alcantara MD        sodium chloride (PF) 0.9% PF flush 3 mL  3 mL Intracatheter Q8H Ibrahima Alcantara MD   3 mL at 01/16/25 2345     Patrick Kelly PA-C

## 2025-01-17 NOTE — PROGRESS NOTES
01/17/25 0900   Appointment Info   Signing Clinician's Name / Credentials (PT) Otis Grace, PT, DPT, OCS   Rehab Comments (PT) Sisters present   Living Environment   Number of Stairs, Within Home, Primary greater than 10 stairs   Stair Railings, Within Home, Primary railings on both sides of stairs;railings safe and in good condition   Self-Care   Equipment Currently Used at Home cane, straight;raised toilet seat;walker, standard   General Information   Onset of Illness/Injury or Date of Surgery 01/16/25   Referring Physician Dr. Alcantara   Patient/Family Therapy Goals Statement (PT) Return home   Pertinent History of Current Problem (include personal factors and/or comorbidities that impact the POC) Pt had L TKA performed on 1/16/25.   Existing Precautions/Restrictions fall   Weight-Bearing Status - LUE full weight-bearing   Weight-Bearing Status - RUE full weight-bearing   Weight-Bearing Status - LLE weight-bearing as tolerated   Weight-Bearing Status - RLE full weight-bearing   Heart Disease Risk Factors Lack of physical activity;Overweight   Cognition   Affect/Mental Status (Cognition) WNL   Orientation Status (Cognition) oriented x 3   Follows Commands (Cognition) WNL   Pain Assessment   Patient Currently in Pain Yes, see Vital Sign flowsheet   Integumentary/Edema   Integumentary/Edema no deficits were identifed   Posture    Posture Not impaired   Range of Motion (ROM)   ROM Comment Approximately 5-80   Strength (Manual Muscle Testing)   Strength Comments L LE globally 3+/5.  R LE 5/5.   Bed Mobility   Bed Mobility supine-sit-supine   Supine-Sit-Supine Ethel (Bed Mobility) independent   Transfers   Transfers sit-stand transfer   Sit-Stand Transfer   Sit-Stand Ethel (Transfers) supervision   Assistive Device (Sit-Stand Transfers) walker, front-wheeled   Gait/Stairs (Locomotion)   Ethel Level (Gait) supervision   Assistive Device (Gait) walker, front-wheeled   Distance in Feet (Gait)  160, 160   Pattern (Gait) step-through   Deviations/Abnormal Patterns (Gait) silvia decreased   Comment, (Gait/Stairs) Completed 8 steps with supervision with L asc. railing.   Balance   Balance Comments Independent in standing with no aD   Sensory Examination   Sensory Perception patient reports no sensory changes   Coordination   Coordination no deficits were identified   Muscle Tone   Muscle Tone no deficits were identified   Clinical Impression   Criteria for Skilled Therapeutic Intervention Yes, treatment indicated   PT Diagnosis (PT) Generalized weakness   Influenced by the following impairments weakness, decreased ROM, impaired balance.   Functional limitations due to impairments transfers, ambulation, stairs   Clinical Presentation (PT Evaluation Complexity) stable   Clinical Presentation Rationale Clinical judgement   Clinical Decision Making (Complexity) low complexity   Planned Therapy Interventions (PT) balance training;bed mobility training;gait training;home exercise program;neuromuscular re-education;patient/family education;ROM (range of motion);stair training;strengthening;stretching;transfer training;home program guidelines   Risk & Benefits of therapy have been explained evaluation/treatment results reviewed;care plan/treatment goals reviewed;risks/benefits reviewed;current/potential barriers reviewed;participants voiced agreement with care plan;participants included;patient;sibling   PT Total Evaluation Time   PT Eval, Low Complexity Minutes (67115) 15   Physical Therapy Goals   PT Frequency One time eval and treatment only   PT Predicted Duration/Target Date for Goal Attainment 01/17/25   PT Goals Gait;Stairs   PT: Gait Supervision/stand-by assist;150 feet;Goal Met;Rolling walker   PT: Stairs Supervision/stand-by assist;8 stairs;Rail on left;Goal Met   Interventions   Interventions Quick Adds Therapeutic Procedure   Therapeutic Procedure/Exercise   Ther. Procedure: strength, endurance, ROM,  flexibillity Minutes (76980) 15   Symptoms Noted During/After Treatment fatigue   Treatment Detail/Skilled Intervention Supine exercises 10 reps each: ankle pumps, glute set, quad set, hamstring set, SLR, and SAQ.   PT Discharge Planning   PT Plan DC   PT Discharge Recommendation (DC Rec) home with outpatient physical therapy   PT Rationale for DC Rec Pt demonstrated appropriate functional mobility to mobilize within prior living environment.  Pt will have the assistance of her sister initially upon discharge.   PT Brief overview of current status Independent with bed mobility, supervision with transfers, ambulation with 2WW, and stairs.   PT Total Distance Amb During Session (feet) 320   Physical Therapy Time and Intention   Timed Code Treatment Minutes 15   Total Session Time (sum of timed and untimed services) 30

## 2025-01-17 NOTE — PROGRESS NOTES
[facility] DISCHARGE NOTE    Patient discharged to home at 11:35 AM via wheel chair. Accompanied by sister and staff. Discharge instructions reviewed with patient and sister, and friend , opportunity offered to ask questions. Prescriptions sent to patients preferred pharmacy. All belongings sent with patient. Education given about pain, after surgery info and appointments.    Dahiana Cosby RN

## 2025-01-17 NOTE — DISCHARGE SUMMARY
[facility] Orthopedics Discharge Summary                                  [facility]     GUS COATES [id number]   Age: 64 year old  PCP: Dahiana Rios, [phone] 1960     Date of Admission:  1/16/2025  Date of Discharge::  1/17/2025 11:35 AM  Discharge Physician:  Jadiel Vargas PA-C    Code status:  Full Code    Admission Information:  Admission Diagnosis:  Knee pain [M25.569]    Post-Operative Day: 1 Day Post-Op     Reason for admission:  The patient was admitted for the following:Procedure(s) (LRB):  Total Knee Arthroplasty left (Left)    Active Problems:    S/P knee replacement      Allergies:  No known allergies    Following the procedure noted above the patient was transferred to the post-op floor and started on:    Therapy:  physical therapy  Anticoagulation Plan:  mg daily  for 30 days  Pain Management: oxycodone, toradol, tylenol, and vistaril   Weight bearing status: Weight bearing as tolerated     The patient was followed and co-managed by the hospitalist service during the inpatient treatment course  Complications:  None  Consultations:  None     Pertinent Labs   Lab Results: personally reviewed.     Recent Labs   Lab Test 01/17/25  0526 01/02/25  1409 11/08/24  1133 11/27/23  0925 12/05/22  0758   WBC  --  7.4 7.3 5.9 5.1   HGB 13.6 14.5 15.0 15.3 14.9   HCT  --  44.4 45.6 46.6 44.6   MCV  --  92 92 94 93   PLT  --  296 315 288 278   NA  --   --  141 140 138          Discharge Information:  Condition at discharge: Stable  Discharge destination:  Discharged to home     Medications at discharge:  Current Discharge Medication List        START taking these medications    Details   acetaminophen (TYLENOL) 325 MG tablet Take 2 tablets (650 mg) by mouth every 4 hours as needed for other (mild pain).    Associated Diagnoses: Status post left knee replacement      aspirin (ASA) 325 MG EC tablet Take 1 tablet (325 mg) by mouth daily.  Qty: 30 tablet, Refills: 0    Associated  Diagnoses: Status post left knee replacement      hydrOXYzine HCl (ATARAX) 10 MG tablet Take 1 tablet (10 mg) by mouth every 6 hours as needed for itching or anxiety (with pain, moderate pain).  Qty: 30 tablet, Refills: 0    Associated Diagnoses: Status post left knee replacement      oxyCODONE (ROXICODONE) 5 MG tablet Take 1-2 tablets (5-10 mg) by mouth every 4 hours as needed for moderate to severe pain.  Qty: 33 tablet, Refills: 0    Associated Diagnoses: Status post left knee replacement      senna-docusate (SENOKOT-S/PERICOLACE) 8.6-50 MG tablet Take 1-2 tablets by mouth 2 times daily. Take while on oral narcotics to prevent or treat constipation.  Qty: 30 tablet, Refills: 0    Comments: While taking narcotics  Associated Diagnoses: Status post left knee replacement           CONTINUE these medications which have NOT CHANGED    Details   atorvastatin (LIPITOR) 10 MG tablet Take 1 tablet by mouth once daily  Qty: 90 tablet, Refills: 0    Associated Diagnoses: Mixed hyperlipidemia      calcium carbonate-vitamin D 600-400 MG-UNIT CHEW Take 1 chew tab by mouth 2 times daily  Qty: 180 tablet, Refills: 3    Associated Diagnoses: Dietary counseling and surveillance      levothyroxine (SYNTHROID/LEVOTHROID) 112 MCG tablet Take 1 tablet (112 mcg) by mouth daily. For 6 out of 7 days of the week.  Qty: 90 tablet, Refills: 0    Associated Diagnoses: Postoperative hypothyroidism                        Follow-Up Care:  Patient should be seen in the office in 14 days by the Orthopedic Surgeon/Physician Assistant.  Call [phone] for appointment or questions.    Jadiel Vargas PA-C

## 2025-01-17 NOTE — PROGRESS NOTES
Patient vital signs are at baseline Yes:  Patient able to ambulate as they were prior to admission or with assist devices provided by therapies during their stay: Yes   Patient MUST void prior to discharge: Yes   Patient able to tolerate oral intake: Yes:   Pain has adequate pain control using oral analgesics: Yes    Patient is A&O x4, and walked to bathroom with minimal difficulty.

## 2025-01-17 NOTE — PLAN OF CARE
Occupational Therapy: Orders received. Chart reviewed and discussed with physical therapy,? Occupational Therapy not indicated as pt has no ADL concerns and family/friends to assist at discharge.? Defer discharge recommendations to Physical Therapy.? Will complete orders.

## 2025-01-17 NOTE — PROGRESS NOTES
"[facility] Orthopaedics Progress Note      Post-operative Day: 1 Day Post-Op    Procedure(s):  Total Knee Arthroplasty left  Subjective:    Pt reports mild pain in the left knee, it is well controlled. She is awaiting PT and has outpatient physical therapy already scheduled. She plans on going home later today.     Chest pain, SOB:  no  Nausea, vomiting:  no  Lightheadedness, dizziness:  no  Neuro:  Patient denies new onset numbness or paresthesias      Objective:  Blood pressure 131/70, pulse 58, temperature 97.7  F (36.5  C), temperature source Oral, resp. rate 18, height 1.676 m (5' 6\"), weight 80.3 kg (177 lb), SpO2 97%.    Patient Vitals for the past 24 hrs:   BP Temp Temp src Pulse Resp SpO2 Height Weight   01/17/25 0740 131/70 97.7  F (36.5  C) Oral 58 18 97 % -- --   01/17/25 0442 124/64 97.6  F (36.4  C) Oral 55 20 93 % -- --   01/16/25 2327 (!) 148/74 -- -- 56 19 97 % -- --   01/16/25 2006 -- 97.5  F (36.4  C) Oral -- -- 95 % -- --   01/16/25 1726 (!) 147/82 97.3  F (36.3  C) Oral 63 16 94 % -- --   01/16/25 1703 -- -- -- -- -- 93 % -- --   01/16/25 1700 135/82 -- -- 61 -- -- -- --   01/16/25 1645 139/85 98.2  F (36.8  C) Temporal 78 12 93 % -- --   01/16/25 1630 -- -- -- 56 13 95 % -- --   01/16/25 1615 125/80 -- -- 61 15 98 % -- --   01/16/25 1600 117/75 -- -- 63 17 98 % -- --   01/16/25 1554 130/86 97.6  F (36.4  C) Temporal 71 16 94 % -- --   01/16/25 1209 (!) 151/87 97.8  F (36.6  C) Oral -- 16 99 % -- --   01/16/25 1202 -- -- -- -- -- -- 1.676 m (5' 6\") 80.3 kg (177 lb)       Wt Readings from Last 4 Encounters:   01/16/25 80.3 kg (177 lb)   11/08/24 80.5 kg (177 lb 6.4 oz)   11/27/23 82.1 kg (180 lb 14.4 oz)   12/05/22 80.7 kg (178 lb)       Gen: A&O x 3. NAD.   Wound status: Covered, Ace wrap in place.   Circulation, motion and sensation: Dorsiflexion/plantarflexion intact and equal bilaterally; distal lower extremity sensation is intact and equal bilaterally. Foot and toes are warm and well " perfused.    Swelling: mild  Calf tenderness: calves are soft and non-tender bilaterally      Pertinent Labs   Lab Results: personally reviewed.     Recent Labs   Lab Test 01/17/25  0526 01/02/25  1409 11/08/24  1133 11/27/23  0925 12/05/22  0758   WBC  --  7.4 7.3 5.9 5.1   HGB 13.6 14.5 15.0 15.3 14.9   HCT  --  44.4 45.6 46.6 44.6   MCV  --  92 92 94 93   PLT  --  296 315 288 278   NA  --   --  141 140 138       Plan:   Continue current cares and rehabilitation.  Anticoagulation protocol:  mg daily  x 30  days  Pain medications:  oxycodone, toradol, tylenol, and vistaril  Weight bearing status:  WBAT  Disposition:  Plan for discharge to home with outpatient therapy when medically stable and pain is controlled, cleared by therapy. Later today.                Report completed by:  Jadiel Vargas PA-C  Date: 1/17/2025  Time: 10:20 AM

## 2025-01-21 ASSESSMENT — ACTIVITIES OF DAILY LIVING (ADL)
RISE FROM A CHAIR: ACTIVITY IS SOMEWHAT DIFFICULT
KNEE_ACTIVITY_OF_DAILY_LIVING_SCORE: 54.29
WEAKNESS: I DO NOT HAVE THE SYMPTOM
HOW_WOULD_YOU_RATE_THE_CURRENT_FUNCTION_OF_YOUR_KNEE_DURING_YOUR_USUAL_DAILY_ACTIVITIES_ON_A_SCALE_FROM_0_TO_100_WITH_100_BEING_YOUR_LEVEL_OF_KNEE_FUNCTION_PRIOR_TO_YOUR_INJURY_AND_0_BEING_THE_INABILITY_TO_PERFORM_ANY_OF_YOUR_USUAL_DAILY_ACTIVITIES?: 50
GIVING WAY, BUCKLING OR SHIFTING OF KNEE: I DO NOT HAVE THE SYMPTOM
STAND: ACTIVITY IS MINIMALLY DIFFICULT
SWELLING: THE SYMPTOM AFFECTS MY ACTIVITY SLIGHTLY
STAND: ACTIVITY IS MINIMALLY DIFFICULT
SQUAT: I AM UNABLE TO DO THE ACTIVITY
PAIN: THE SYMPTOM AFFECTS MY ACTIVITY MODERATELY
HOW_WOULD_YOU_RATE_THE_OVERALL_FUNCTION_OF_YOUR_KNEE_DURING_YOUR_USUAL_DAILY_ACTIVITIES?: ABNORMAL
SWELLING: THE SYMPTOM AFFECTS MY ACTIVITY SLIGHTLY
GO UP STAIRS: ACTIVITY IS SOMEWHAT DIFFICULT
SIT WITH YOUR KNEE BENT: ACTIVITY IS SOMEWHAT DIFFICULT
SIT WITH YOUR KNEE BENT: ACTIVITY IS SOMEWHAT DIFFICULT
GO UP STAIRS: ACTIVITY IS SOMEWHAT DIFFICULT
WALK: ACTIVITY IS SOMEWHAT DIFFICULT
GO DOWN STAIRS: ACTIVITY IS SOMEWHAT DIFFICULT
KNEEL ON THE FRONT OF YOUR KNEE: I AM UNABLE TO DO THE ACTIVITY
WALK: ACTIVITY IS SOMEWHAT DIFFICULT
GIVING WAY, BUCKLING OR SHIFTING OF KNEE: I DO NOT HAVE THE SYMPTOM
KNEEL ON THE FRONT OF YOUR KNEE: I AM UNABLE TO DO THE ACTIVITY
KNEE_ACTIVITY_OF_DAILY_LIVING_SUM: 38
GO DOWN STAIRS: ACTIVITY IS SOMEWHAT DIFFICULT
SQUAT: I AM UNABLE TO DO THE ACTIVITY
LIMPING: THE SYMPTOM AFFECTS MY ACTIVITY MODERATELY
WEAKNESS: I DO NOT HAVE THE SYMPTOM
STIFFNESS: THE SYMPTOM AFFECTS MY ACTIVITY MODERATELY
STIFFNESS: THE SYMPTOM AFFECTS MY ACTIVITY MODERATELY
LIMPING: THE SYMPTOM AFFECTS MY ACTIVITY MODERATELY
RAW_SCORE: 38
RISE FROM A CHAIR: ACTIVITY IS SOMEWHAT DIFFICULT
AS_A_RESULT_OF_YOUR_KNEE_INJURY,_HOW_WOULD_YOU_RATE_YOUR_CURRENT_LEVEL_OF_DAILY_ACTIVITY?: ABNORMAL
AS_A_RESULT_OF_YOUR_KNEE_INJURY,_HOW_WOULD_YOU_RATE_YOUR_CURRENT_LEVEL_OF_DAILY_ACTIVITY?: ABNORMAL
PAIN: THE SYMPTOM AFFECTS MY ACTIVITY MODERATELY
PLEASE_INDICATE_YOR_PRIMARY_REASON_FOR_REFERRAL_TO_THERAPY:: KNEE
HOW_WOULD_YOU_RATE_THE_OVERALL_FUNCTION_OF_YOUR_KNEE_DURING_YOUR_USUAL_DAILY_ACTIVITIES?: ABNORMAL
HOW_WOULD_YOU_RATE_THE_CURRENT_FUNCTION_OF_YOUR_KNEE_DURING_YOUR_USUAL_DAILY_ACTIVITIES_ON_A_SCALE_FROM_0_TO_100_WITH_100_BEING_YOUR_LEVEL_OF_KNEE_FUNCTION_PRIOR_TO_YOUR_INJURY_AND_0_BEING_THE_INABILITY_TO_PERFORM_ANY_OF_YOUR_USUAL_DAILY_ACTIVITIES?: 50

## 2025-01-22 ENCOUNTER — THERAPY VISIT (OUTPATIENT)
Dept: PHYSICAL THERAPY | Facility: CLINIC | Age: 65
End: 2025-01-22
Attending: ORTHOPAEDIC SURGERY
Payer: COMMERCIAL

## 2025-01-22 DIAGNOSIS — Z96.652 S/P TOTAL KNEE ARTHROPLASTY, LEFT: Primary | ICD-10-CM

## 2025-01-22 PROCEDURE — 97161 PT EVAL LOW COMPLEX 20 MIN: CPT | Mod: GP | Performed by: PHYSICAL THERAPIST

## 2025-01-22 PROCEDURE — 97110 THERAPEUTIC EXERCISES: CPT | Mod: GP | Performed by: PHYSICAL THERAPIST

## 2025-01-22 NOTE — PROGRESS NOTES
PHYSICAL THERAPY EVALUATION  Type of Visit: Evaluation        Fall Risk Screen:  Fall screen completed by: PT  Have you fallen 2 or more times in the past year?: (Patient-Rptd) No  Have you fallen and had an injury in the past year?: (Patient-Rptd) No  Is patient a fall risk?: No    Subjective         Presenting condition or subjective complaint: Knee replacement.  Pt is s/p L TKA on 1/16/25.  Pt reports intermittent pain 6/10.  Meds: tylenol, only took oxycodone first 2 nights.   Currently walking w/ FWW.  Marked time on stairs.  Pt reports she is sleeping ok w/ tylenol.   Date of onset: 01/16/25    Relevant medical history:   MS, Pt states she needs R TKA  Dates & types of surgery:  thyroid removed due cancer    Prior diagnostic imaging/testing results: MRI; X-ray     Prior therapy history for the same diagnosis, illness or injury: No      Prior Level of Function  Transfers: Independent  Ambulation: Independent  ADL: Independent  IADL: Housekeeping    Living Environment  Social support: With family members   Type of home: House; 2-story   Stairs to enter the home: No       Ramp: No   Stairs inside the home: Yes 14 Is there a railing: Yes     Help at home: None  Equipment owned: Straight Cane; Walker with wheels; Bath bench     Employment: Not Applicable    Hobbies/Interests: Golf, kayaking, pickleball, hiking    Patient goals for therapy:  Walk w/o a device and play pickleball by March         Objective   KNEE EVALUATION    INTEGUMENTARY (edema, incisions): incision covered w/ bandage.  Girth suprapatellar: R 45.0 cm,  L 50.0 cm  GAIT:  Mild +  limp w/ FWW.  Holds knee stiff/ lacks heel/toe pattern  ROM: KE in long sit  R lacks 2*, L lacks 5*.  KF w/ heelslide L AROM 65*, AA w/ sheet 75*  STRENGTH: good quad set.   SLR initially w/ min A of 1, able to do 1 rep on own w/ difficulty      Assessment & Plan   CLINICAL IMPRESSIONS  Medical Diagnosis: s/p L TKA    Treatment Diagnosis: decreased ROM/ strength following  TKA   Impression/Assessment: Patient is a 64 year old female with L knee complaints.  The following significant findings have been identified: Pain, Decreased ROM/flexibility, Decreased joint mobility, Decreased strength, Edema, Impaired gait, Impaired muscle performance, and Decreased activity tolerance. These impairments interfere with their ability to perform self care tasks, recreational activities, household chores, driving , household mobility, and community mobility as compared to previous level of function.     Clinical Decision Making (Complexity):  Clinical Presentation: Stable/Uncomplicated  Clinical Presentation Rationale: based on medical and personal factors listed in PT evaluation  Clinical Decision Making (Complexity): Low complexity    PLAN OF CARE  Treatment Interventions:  Interventions: Manual Therapy, Neuromuscular Re-education, Therapeutic Activity, Therapeutic Exercise    Long Term Goals     PT Goal 1  Goal Identifier: 1.  Goal Description: Pt will be able to walk w/o assistive device w/ slight to no limp  Target Date: 02/26/25  PT Goal 2  Goal Identifier: 2.  Goal Description: Pt will have increased KF to 120* for increased ease w/ ADL's  Target Date: 03/19/25  PT Goal 3  Goal Identifier: 3.  Goal Description: Pt will be able to do stairs reciprocal w/ 1 rail and minimal difficulty  Target Date: 03/19/25  PT Goal 4  Goal Identifier: 4.  Goal Description: Pt will be independent and consistent w/HEP to manage symptoms and return to light pickleball playing  Target Date: 03/19/25      Frequency of Treatment: 2X/week X 6 weeks then 1X/week X 2 weeks  Duration of Treatment: 14 visits in 8 weeks    Recommended Referrals to Other Professionals:  none  Education Assessment:   Learner/Method: Patient;Listening;Reading;Demonstration;Pictures/Video;No Barriers to Learning    Risks and benefits of evaluation/treatment have been explained.   Patient/Family/caregiver agrees with Plan of Care.      Evaluation Time:     PT Eval, Low Complexity Minutes (41749): 15       Signing Clinician: Nathaly Gibbs PT        [facility]                                                                                   OUTPATIENT PHYSICAL THERAPY      PLAN OF TREATMENT FOR OUTPATIENT REHABILITATION   Patient's Last Name, First Name, YAMILEJaneMARILIAJane  LeticiaPadma QUINN YOB: 1960   Provider's Name   [facility]   Medical Record No.  [id number]     Onset Date: 01/16/25  Start of Care Date: 01/22/25     Medical Diagnosis:  s/p L TKA      PT Treatment Diagnosis:  decreased ROM/ strength following TKA Plan of Treatment  Frequency/Duration: 2X/week X 6 weeks then 1X/week X 2 weeks/ 14 visits in 8 weeks    Certification date from 01/22/25 to 03/19/25         See note for plan of treatment details and functional goals     Nathaly Gibbs, PT                         I CERTIFY THE NEED FOR THESE SERVICES FURNISHED UNDER        THIS PLAN OF TREATMENT AND WHILE UNDER MY CARE .             Physician Signature               Date    X_____________________________________________________                  Referring Provider:  Ibrahima Alcantara    Initial Assessment  See Epic Evaluation- Start of Care Date: 01/22/25

## 2025-01-27 ENCOUNTER — THERAPY VISIT (OUTPATIENT)
Dept: PHYSICAL THERAPY | Facility: CLINIC | Age: 65
End: 2025-01-27
Attending: ORTHOPAEDIC SURGERY
Payer: COMMERCIAL

## 2025-01-27 DIAGNOSIS — Z96.652 S/P TOTAL KNEE ARTHROPLASTY, LEFT: Primary | ICD-10-CM

## 2025-01-27 PROCEDURE — 97110 THERAPEUTIC EXERCISES: CPT | Mod: GP | Performed by: PHYSICAL THERAPIST

## 2025-02-03 ENCOUNTER — TRANSFERRED RECORDS (OUTPATIENT)
Dept: HEALTH INFORMATION MANAGEMENT | Facility: CLINIC | Age: 65
End: 2025-02-03
Payer: COMMERCIAL

## 2025-02-24 ENCOUNTER — OFFICE VISIT (OUTPATIENT)
Dept: NEUROLOGY | Facility: CLINIC | Age: 65
End: 2025-02-24
Attending: FAMILY MEDICINE
Payer: COMMERCIAL

## 2025-02-24 VITALS — HEART RATE: 72 BPM | DIASTOLIC BLOOD PRESSURE: 82 MMHG | SYSTOLIC BLOOD PRESSURE: 135 MMHG

## 2025-02-24 DIAGNOSIS — G35 MULTIPLE SCLEROSIS (H): Primary | ICD-10-CM

## 2025-02-24 PROCEDURE — 99204 OFFICE O/P NEW MOD 45 MIN: CPT | Performed by: PSYCHIATRY & NEUROLOGY

## 2025-02-24 NOTE — PATIENT INSTRUCTIONS
You are doing quite well despite having MS for a long time    B12 and folate levels     If you struggle with muscle stiffness or balance problems as you are recovering from your knee replacement, reach out and a referral to physical therapy can be provided     Pelvic floor therapy can be helpful for your bladder symptoms if needed     Follow up as needed

## 2025-02-24 NOTE — LETTER
2/24/2025      Padma Kelly  [address] [facility] [address]      Dear Colleague,    Thank you for referring your patient, Padma Kelly, to the [facility]. Please see a copy of my visit note below.    Date of Service: 2/24/2025    [facility]   MS Clinic Evaluation    Subjective: 64-year-old woman who presents for evaluation of multiple sclerosis.  She also has a history of hypothyroidism and hyperlipidemia.    Disease onset in 1986 when she recalls having numbness of the left hand that spread up the left arm.  MRI and CSF studies were completed and she was diagnosed with multiple sclerosis.  She recalls having various relapses over the years.  Her entire left side went numb on 1 occasion.  Her right hand went numb on 1 occasion.  Each spell would be self-limited for approximately 8 to 10 weeks.  Her last relapse occurred in 2007 when she went numb from the waist down.  She has residual numbness in her feet and legs.  This was not a complete loss of sensation, but she has difficulty describing the quality of the numbness.    Denies any limitation of gait.  She is able to go on backpacking trips where she will hike for 7 to 11 miles in a day.    She does experience some urinary urgency that has been manageable with the use of a pad.  She denies any bowel dysfunction.    Her energy level is adequate.    She remains quite active by golfing/walking on the course, biking, kayaking and playing pickle ball.    She recently underwent a left TKA and is recovering adequately from the procedure.    Allergies   Allergen Reactions     No Known Allergies        Current Outpatient Medications   Medication Sig Dispense Refill     atorvastatin (LIPITOR) 10 MG tablet Take 1 tablet by mouth once daily 90 tablet 0     calcium carbonate-vitamin D 600-400 MG-UNIT CHEW Take 1 chew tab by mouth 2 times daily 180 tablet 3     levothyroxine (SYNTHROID/LEVOTHROID) 112 MCG tablet  Take 1 tablet (112 mcg) by mouth daily. For 6 out of 7 days of the week. 90 tablet 0     acetaminophen (TYLENOL) 325 MG tablet Take 2 tablets (650 mg) by mouth every 4 hours as needed for other (mild pain). (Patient not taking: Reported on 2/24/2025)       aspirin (ASA) 325 MG EC tablet Take 1 tablet (325 mg) by mouth daily. (Patient not taking: Reported on 2/24/2025) 30 tablet 0     hydrOXYzine HCl (ATARAX) 10 MG tablet Take 1 tablet (10 mg) by mouth every 6 hours as needed for itching or anxiety (with pain, moderate pain). 30 tablet 0     oxyCODONE (ROXICODONE) 5 MG tablet Take 1-2 tablets (5-10 mg) by mouth every 4 hours as needed for moderate to severe pain. 33 tablet 0     senna-docusate (SENOKOT-S/PERICOLACE) 8.6-50 MG tablet Take 1-2 tablets by mouth 2 times daily. Take while on oral narcotics to prevent or treat constipation. 30 tablet 0     No current facility-administered medications for this visit.        Past medical, surgical, social and family history was personally reviewed. Pertinent details noted above.     Physical Examination:   /82 (BP Location: Right arm, Patient Position: Sitting, Cuff Size: Adult Large)   Pulse 72   LMP  (LMP Unknown)     General: no acute distress  Cranial nerves:   VFFC  PERRL w/no RAPD  EOM full w/no EDGAR   Face symmetric  Hearing intact  No dysarthria   Motor:   Tone is normal   Bulk is normal     R L  Deltoid  5 5  Biceps  5 5  Triceps 5 5  Wrist ext 5 5  Finger ext 5 5  Finger abd 5 5    Hip flexion 5 5  Knee flexion 5 5  Knee ext 5 5  Ankle d/f 5 5    Reflexes: 2+ and symmetric throughout, babinski absent bilaterally  Sensory: vibration is severely reduced in the left toe, moderate L ankle, absent R toe, sev R ankle, mild R knee, JPS is mildly reduced in the right toe, mod/sev in the left toe  Romberg is absent  Coordination: no ataxia or dysmetria  Gait: slightly widened base with stiff stride of LLE (TKA L one month ago), tandem gait is intact, able to balance  on one foot x 10 seconds     Tests/Imaging:       MRI Brain  2023-mild/mod periventricular lesion burden, no posterior fossa lesions, gd-     MRI Cervical spine   2023-faint lesions noted c2-3. C3-4, c6, lesion at T2 more hyperintense    MRI Thoracic spine   Not done     Assessment: 64-year-old woman who has a history of relapsing remitting multiple sclerosis.  No symptoms or signs suggestive of secondary progressive disease at present time.    Clinical examination remains quite excellent aside from reduced sensation in the lower extremities.  I recommended assessment for a vitamin deficiency given the loss of sensation, though do suspect this is related to multiple sclerosis.    He is encouraged to keep up with her physical activity.  This is helpful for preventing/decreasing the risk for progressive disease.    We discussed how inflammatory activity related to multiple sclerosis typically tapers after age 55 and is typically gone by age 65.  Therefore that means that no treatment specific for multiple sclerosis is needed at this time.    As she recovers from her TKA she may benefit from neurophysical therapy.  She was advised to monitor muscle stiffness and balance.  She may reach out to me if she would like to pursue this.  Additionally, pelvic floor therapy may be helpful for her urinary symptoms.    Plan:   -B12 and folate levels  - Follow-up as needed    Note was completed with the assistance of Dragon Fluency software which can often result in accidental word substitutions.     A total of 45 minutes on the date of service were spent in the care of this patient.   Danette Arroyo MD on 2/24/2025 at 2:15 PM        Again, thank you for allowing me to participate in the care of your patient.        Sincerely,        Danette Arroyo MD    Electronically signed

## 2025-02-24 NOTE — PROGRESS NOTES
Date of Service: 2/24/2025    [facility]   MS Clinic Evaluation    Subjective: 64-year-old woman who presents for evaluation of multiple sclerosis.  She also has a history of hypothyroidism and hyperlipidemia.    Disease onset in 1986 when she recalls having numbness of the left hand that spread up the left arm.  MRI and CSF studies were completed and she was diagnosed with multiple sclerosis.  She recalls having various relapses over the years.  Her entire left side went numb on 1 occasion.  Her right hand went numb on 1 occasion.  Each spell would be self-limited for approximately 8 to 10 weeks.  Her last relapse occurred in 2007 when she went numb from the waist down.  She has residual numbness in her feet and legs.  This was not a complete loss of sensation, but she has difficulty describing the quality of the numbness.    Denies any limitation of gait.  She is able to go on backpacking trips where she will hike for 7 to 11 miles in a day.    She does experience some urinary urgency that has been manageable with the use of a pad.  She denies any bowel dysfunction.    Her energy level is adequate.    She remains quite active by golfing/walking on the course, biking, kayaking and playing pickle ball.    She recently underwent a left TKA and is recovering adequately from the procedure.    Allergies   Allergen Reactions    No Known Allergies        Current Outpatient Medications   Medication Sig Dispense Refill    atorvastatin (LIPITOR) 10 MG tablet Take 1 tablet by mouth once daily 90 tablet 0    calcium carbonate-vitamin D 600-400 MG-UNIT CHEW Take 1 chew tab by mouth 2 times daily 180 tablet 3    levothyroxine (SYNTHROID/LEVOTHROID) 112 MCG tablet Take 1 tablet (112 mcg) by mouth daily. For 6 out of 7 days of the week. 90 tablet 0    acetaminophen (TYLENOL) 325 MG tablet Take 2 tablets (650 mg) by mouth every 4 hours as needed for other (mild pain). (Patient not taking: Reported on 2/24/2025)      aspirin  (ASA) 325 MG EC tablet Take 1 tablet (325 mg) by mouth daily. (Patient not taking: Reported on 2/24/2025) 30 tablet 0    hydrOXYzine HCl (ATARAX) 10 MG tablet Take 1 tablet (10 mg) by mouth every 6 hours as needed for itching or anxiety (with pain, moderate pain). 30 tablet 0    oxyCODONE (ROXICODONE) 5 MG tablet Take 1-2 tablets (5-10 mg) by mouth every 4 hours as needed for moderate to severe pain. 33 tablet 0    senna-docusate (SENOKOT-S/PERICOLACE) 8.6-50 MG tablet Take 1-2 tablets by mouth 2 times daily. Take while on oral narcotics to prevent or treat constipation. 30 tablet 0     No current facility-administered medications for this visit.        Past medical, surgical, social and family history was personally reviewed. Pertinent details noted above.     Physical Examination:   /82 (BP Location: Right arm, Patient Position: Sitting, Cuff Size: Adult Large)   Pulse 72   LMP  (LMP Unknown)     General: no acute distress  Cranial nerves:   VFFC  PERRL w/no RAPD  EOM full w/no EDGAR   Face symmetric  Hearing intact  No dysarthria   Motor:   Tone is normal   Bulk is normal     R L  Deltoid  5 5  Biceps  5 5  Triceps 5 5  Wrist ext 5 5  Finger ext 5 5  Finger abd 5 5    Hip flexion 5 5  Knee flexion 5 5  Knee ext 5 5  Ankle d/f 5 5    Reflexes: 2+ and symmetric throughout, babinski absent bilaterally  Sensory: vibration is severely reduced in the left toe, moderate L ankle, absent R toe, sev R ankle, mild R knee, JPS is mildly reduced in the right toe, mod/sev in the left toe  Romberg is absent  Coordination: no ataxia or dysmetria  Gait: slightly widened base with stiff stride of LLE (TKA L one month ago), tandem gait is intact, able to balance on one foot x 10 seconds     Tests/Imaging:       MRI Brain  2023-mild/mod periventricular lesion burden, no posterior fossa lesions, gd-     MRI Cervical spine   2023-faint lesions noted c2-3. C3-4, c6, lesion at T2 more hyperintense    MRI Thoracic spine   Not done      Assessment: 64-year-old woman who has a history of relapsing remitting multiple sclerosis.  No symptoms or signs suggestive of secondary progressive disease at present time.    Clinical examination remains quite excellent aside from reduced sensation in the lower extremities.  I recommended assessment for a vitamin deficiency given the loss of sensation, though do suspect this is related to multiple sclerosis.    He is encouraged to keep up with her physical activity.  This is helpful for preventing/decreasing the risk for progressive disease.    We discussed how inflammatory activity related to multiple sclerosis typically tapers after age 55 and is typically gone by age 65.  Therefore that means that no treatment specific for multiple sclerosis is needed at this time.    As she recovers from her TKA she may benefit from neurophysical therapy.  She was advised to monitor muscle stiffness and balance.  She may reach out to me if she would like to pursue this.  Additionally, pelvic floor therapy may be helpful for her urinary symptoms.    Plan:   -B12 and folate levels  - Follow-up as needed    Note was completed with the assistance of Dragon Fluency software which can often result in accidental word substitutions.     A total of 45 minutes on the date of service were spent in the care of this patient.   Danette Arroyo MD on 2/24/2025 at 2:15 PM

## 2025-03-03 ENCOUNTER — MYC REFILL (OUTPATIENT)
Dept: FAMILY MEDICINE | Facility: CLINIC | Age: 65
End: 2025-03-03
Payer: COMMERCIAL

## 2025-03-03 DIAGNOSIS — E89.0 POSTOPERATIVE HYPOTHYROIDISM: ICD-10-CM

## 2025-03-03 RX ORDER — LEVOTHYROXINE SODIUM 112 UG/1
112 TABLET ORAL DAILY
Qty: 90 TABLET | Refills: 0 | Status: SHIPPED | OUTPATIENT
Start: 2025-03-03

## 2025-03-26 DIAGNOSIS — E78.2 MIXED HYPERLIPIDEMIA: ICD-10-CM

## 2025-03-26 RX ORDER — ATORVASTATIN CALCIUM 10 MG/1
10 TABLET, FILM COATED ORAL DAILY
Qty: 90 TABLET | Refills: 0 | Status: SHIPPED | OUTPATIENT
Start: 2025-03-26

## 2025-06-18 DIAGNOSIS — E89.0 POSTOPERATIVE HYPOTHYROIDISM: ICD-10-CM

## 2025-06-18 RX ORDER — LEVOTHYROXINE SODIUM 112 UG/1
TABLET ORAL
Qty: 90 TABLET | Refills: 0 | Status: SHIPPED | OUTPATIENT
Start: 2025-06-18

## (undated) DEVICE — DECANTER VIAL 2006S

## (undated) DEVICE — SOL WATER IRRIG 1000ML BOTTLE 07139-09

## (undated) DEVICE — Device

## (undated) DEVICE — SUCTION MANIFOLD NEPTUNE 2 SYS 4 PORT 0702-020-000

## (undated) DEVICE — GLOVE BIOGEL PI MICRO INDICATOR UNDERGLOVE SZ 8.0 48980

## (undated) DEVICE — STOCKING SLEEVE COMPRESSION CALF MED

## (undated) DEVICE — GLOVE BIOGEL PI MICRO INDICATOR UNDERGLOVE SZ 6.5 48965

## (undated) DEVICE — BONE CLEANING TIP INTERPULSE  0210-010-000

## (undated) DEVICE — BLADE SAW OSCIL/SAG STRK 11X90X1.19MM 4/2000 4111-119-090

## (undated) DEVICE — GOWN LG DISP 9515

## (undated) DEVICE — SU PDO 1 STRATAFIX 36X36CM CTX TAPERPOINT SXPD2B405

## (undated) DEVICE — SOL NACL 0.9% IRRIG 3000ML BAG 07972-08

## (undated) DEVICE — NDL SPINAL 18GA 3.5" 405184

## (undated) DEVICE — SU MONOCRYL 2-0 CT-1 36" UND Y945H

## (undated) DEVICE — BNDG ELASTIC 6" DBL LENGTH UNSTERILE 6611-16

## (undated) DEVICE — SUTURE ABSORBABLE VICRYL CT-B1 L36 IN BRAID VIOLET JB947H

## (undated) DEVICE — SU STRATAFIX MONOCRYL 3-0 SPIRAL PS-2 30CM SXMP1B106

## (undated) DEVICE — GOWN IMPERVIOUS SPECIALTY XLG/XLONG 32474

## (undated) DEVICE — GLOVE BIOGEL PI MICRO SZ 8.0 48580

## (undated) DEVICE — SUCTION IRR SYSTEM W/TIP INTERPULSE

## (undated) DEVICE — GLOVE BIOGEL PI MICRO SZ 6.5 48565

## (undated) DEVICE — BNDG COBAN 6"X5YDS STERILE

## (undated) DEVICE — DRAPE SHEET REV FOLD 3/4 9349

## (undated) DEVICE — BONE CEMENT KIT BOWL AND SPATULA STRK 6201-003-410

## (undated) DEVICE — BLADE SAW SAGITTAL STRK 13X90X1.19MM HD SYS 6 6113-119-090

## (undated) DEVICE — TOURNIQUET SGL  BLADDER 30" DL PORT BLUE 5921-030-235

## (undated) DEVICE — DRSG AQUACEL AG 3.5X9.75" HYDROFIBER 412011

## (undated) DEVICE — PREP CHLORAPREP 26ML TINTED ORANGE  260815

## (undated) DEVICE — KIT ATTUNE EPAK PIN SYSTEM 2544-00-111

## (undated) DEVICE — SYR 20ML LL W/O NDL

## (undated) DEVICE — ESU PENCIL SMOKE EVAC W/ROCKER SWITCH 0703-047-000

## (undated) RX ORDER — ACETAMINOPHEN 325 MG/1
TABLET ORAL
Status: DISPENSED
Start: 2025-01-16

## (undated) RX ORDER — LIDOCAINE HYDROCHLORIDE 10 MG/ML
INJECTION, SOLUTION EPIDURAL; INFILTRATION; INTRACAUDAL; PERINEURAL
Status: DISPENSED
Start: 2025-01-16

## (undated) RX ORDER — FENTANYL CITRATE 50 UG/ML
INJECTION, SOLUTION INTRAMUSCULAR; INTRAVENOUS
Status: DISPENSED
Start: 2025-01-16

## (undated) RX ORDER — TRANEXAMIC ACID 650 MG/1
TABLET ORAL
Status: DISPENSED
Start: 2025-01-16

## (undated) RX ORDER — CEFAZOLIN SODIUM/WATER 2 G/20 ML
SYRINGE (ML) INTRAVENOUS
Status: DISPENSED
Start: 2025-01-16

## (undated) RX ORDER — DEXMEDETOMIDINE HYDROCHLORIDE 100 UG/ML
INJECTION, SOLUTION INTRAVENOUS
Status: DISPENSED
Start: 2025-01-16

## (undated) RX ORDER — KETOROLAC TROMETHAMINE 30 MG/ML
INJECTION, SOLUTION INTRAMUSCULAR; INTRAVENOUS
Status: DISPENSED
Start: 2025-01-16

## (undated) RX ORDER — PROPOFOL 10 MG/ML
INJECTION, EMULSION INTRAVENOUS
Status: DISPENSED
Start: 2025-01-16

## (undated) RX ORDER — KETOROLAC TROMETHAMINE 15 MG/ML
INJECTION, SOLUTION INTRAMUSCULAR; INTRAVENOUS
Status: DISPENSED
Start: 2025-01-16

## (undated) RX ORDER — HYDROXYZINE HYDROCHLORIDE 10 MG/1
TABLET, FILM COATED ORAL
Status: DISPENSED
Start: 2025-01-16

## (undated) RX ORDER — GABAPENTIN 100 MG/1
CAPSULE ORAL
Status: DISPENSED
Start: 2025-01-16

## (undated) RX ORDER — ROPIVACAINE HYDROCHLORIDE 5 MG/ML
INJECTION, SOLUTION EPIDURAL; INFILTRATION; PERINEURAL
Status: DISPENSED
Start: 2025-01-16

## (undated) RX ORDER — HYDROMORPHONE HCL IN WATER/PF 6 MG/30 ML
PATIENT CONTROLLED ANALGESIA SYRINGE INTRAVENOUS
Status: DISPENSED
Start: 2025-01-16

## (undated) RX ORDER — DEXAMETHASONE SODIUM PHOSPHATE 4 MG/ML
INJECTION, SOLUTION INTRA-ARTICULAR; INTRALESIONAL; INTRAMUSCULAR; INTRAVENOUS; SOFT TISSUE
Status: DISPENSED
Start: 2025-01-16

## (undated) RX ORDER — BUPIVACAINE HYDROCHLORIDE 5 MG/ML
INJECTION, SOLUTION EPIDURAL; INTRACAUDAL
Status: DISPENSED
Start: 2025-01-16

## (undated) RX ORDER — GLYCOPYRROLATE 0.2 MG/ML
INJECTION, SOLUTION INTRAMUSCULAR; INTRAVENOUS
Status: DISPENSED
Start: 2025-01-16